# Patient Record
Sex: FEMALE | Race: WHITE | NOT HISPANIC OR LATINO
[De-identification: names, ages, dates, MRNs, and addresses within clinical notes are randomized per-mention and may not be internally consistent; named-entity substitution may affect disease eponyms.]

---

## 2017-11-08 ENCOUNTER — APPOINTMENT (OUTPATIENT)
Dept: ORTHOPEDIC SURGERY | Facility: CLINIC | Age: 62
End: 2017-11-08
Payer: MEDICARE

## 2017-11-08 PROCEDURE — 99213 OFFICE O/P EST LOW 20 MIN: CPT

## 2017-11-24 ENCOUNTER — OUTPATIENT (OUTPATIENT)
Dept: OUTPATIENT SERVICES | Facility: HOSPITAL | Age: 62
LOS: 1 days | End: 2017-11-24
Payer: COMMERCIAL

## 2017-11-24 VITALS
HEIGHT: 60 IN | HEART RATE: 81 BPM | RESPIRATION RATE: 14 BRPM | SYSTOLIC BLOOD PRESSURE: 146 MMHG | DIASTOLIC BLOOD PRESSURE: 91 MMHG | WEIGHT: 177.91 LBS | OXYGEN SATURATION: 98 % | TEMPERATURE: 99 F

## 2017-11-24 DIAGNOSIS — M16.11 UNILATERAL PRIMARY OSTEOARTHRITIS, RIGHT HIP: ICD-10-CM

## 2017-11-24 DIAGNOSIS — Z98.1 ARTHRODESIS STATUS: Chronic | ICD-10-CM

## 2017-11-24 DIAGNOSIS — Z01.818 ENCOUNTER FOR OTHER PREPROCEDURAL EXAMINATION: ICD-10-CM

## 2017-11-24 DIAGNOSIS — Z98.890 OTHER SPECIFIED POSTPROCEDURAL STATES: Chronic | ICD-10-CM

## 2017-11-24 DIAGNOSIS — Z90.710 ACQUIRED ABSENCE OF BOTH CERVIX AND UTERUS: Chronic | ICD-10-CM

## 2017-11-24 DIAGNOSIS — Z87.39 PERSONAL HISTORY OF OTHER DISEASES OF THE MUSCULOSKELETAL SYSTEM AND CONNECTIVE TISSUE: ICD-10-CM

## 2017-11-24 LAB
ALBUMIN SERPL ELPH-MCNC: 3.9 G/DL — SIGNIFICANT CHANGE UP (ref 3.3–5)
ALP SERPL-CCNC: 72 U/L — SIGNIFICANT CHANGE UP (ref 30–120)
ALT FLD-CCNC: 26 U/L DA — SIGNIFICANT CHANGE UP (ref 10–60)
ANION GAP SERPL CALC-SCNC: 7 MMOL/L — SIGNIFICANT CHANGE UP (ref 5–17)
APTT BLD: 31.7 SEC — SIGNIFICANT CHANGE UP (ref 27.5–37.4)
AST SERPL-CCNC: 22 U/L — SIGNIFICANT CHANGE UP (ref 10–40)
BILIRUB SERPL-MCNC: 0.4 MG/DL — SIGNIFICANT CHANGE UP (ref 0.2–1.2)
BLD GP AB SCN SERPL QL: SIGNIFICANT CHANGE UP
BUN SERPL-MCNC: 14 MG/DL — SIGNIFICANT CHANGE UP (ref 7–23)
CALCIUM SERPL-MCNC: 9.9 MG/DL — SIGNIFICANT CHANGE UP (ref 8.4–10.5)
CHLORIDE SERPL-SCNC: 104 MMOL/L — SIGNIFICANT CHANGE UP (ref 96–108)
CO2 SERPL-SCNC: 28 MMOL/L — SIGNIFICANT CHANGE UP (ref 22–31)
CREAT SERPL-MCNC: 0.63 MG/DL — SIGNIFICANT CHANGE UP (ref 0.5–1.3)
GLUCOSE SERPL-MCNC: 93 MG/DL — SIGNIFICANT CHANGE UP (ref 70–99)
HCT VFR BLD CALC: 39.5 % — SIGNIFICANT CHANGE UP (ref 34.5–45)
HGB BLD-MCNC: 13.1 G/DL — SIGNIFICANT CHANGE UP (ref 11.5–15.5)
INR BLD: 0.97 RATIO — SIGNIFICANT CHANGE UP (ref 0.88–1.16)
MCHC RBC-ENTMCNC: 33.1 GM/DL — SIGNIFICANT CHANGE UP (ref 32–36)
MCHC RBC-ENTMCNC: 33.1 PG — SIGNIFICANT CHANGE UP (ref 27–34)
MCV RBC AUTO: 99.8 FL — SIGNIFICANT CHANGE UP (ref 80–100)
PLATELET # BLD AUTO: 311 K/UL — SIGNIFICANT CHANGE UP (ref 150–400)
POTASSIUM SERPL-MCNC: 4.5 MMOL/L — SIGNIFICANT CHANGE UP (ref 3.5–5.3)
POTASSIUM SERPL-SCNC: 4.5 MMOL/L — SIGNIFICANT CHANGE UP (ref 3.5–5.3)
PROT SERPL-MCNC: 7.8 G/DL — SIGNIFICANT CHANGE UP (ref 6–8.3)
PROTHROM AB SERPL-ACNC: 10.6 SEC — SIGNIFICANT CHANGE UP (ref 9.8–12.7)
RBC # BLD: 3.96 M/UL — SIGNIFICANT CHANGE UP (ref 3.8–5.2)
RBC # FLD: 13.3 % — SIGNIFICANT CHANGE UP (ref 10.3–14.5)
SODIUM SERPL-SCNC: 139 MMOL/L — SIGNIFICANT CHANGE UP (ref 135–145)
WBC # BLD: 5.9 K/UL — SIGNIFICANT CHANGE UP (ref 3.8–10.5)
WBC # FLD AUTO: 5.9 K/UL — SIGNIFICANT CHANGE UP (ref 3.8–10.5)

## 2017-11-24 PROCEDURE — 86901 BLOOD TYPING SEROLOGIC RH(D): CPT

## 2017-11-24 PROCEDURE — 86900 BLOOD TYPING SEROLOGIC ABO: CPT

## 2017-11-24 PROCEDURE — G0463: CPT

## 2017-11-24 PROCEDURE — 85610 PROTHROMBIN TIME: CPT

## 2017-11-24 PROCEDURE — 87641 MR-STAPH DNA AMP PROBE: CPT

## 2017-11-24 PROCEDURE — 93005 ELECTROCARDIOGRAM TRACING: CPT

## 2017-11-24 PROCEDURE — 87640 STAPH A DNA AMP PROBE: CPT

## 2017-11-24 PROCEDURE — 85027 COMPLETE CBC AUTOMATED: CPT

## 2017-11-24 PROCEDURE — 85730 THROMBOPLASTIN TIME PARTIAL: CPT

## 2017-11-24 PROCEDURE — 93010 ELECTROCARDIOGRAM REPORT: CPT | Mod: NC

## 2017-11-24 PROCEDURE — 86850 RBC ANTIBODY SCREEN: CPT

## 2017-11-24 PROCEDURE — 80053 COMPREHEN METABOLIC PANEL: CPT

## 2017-11-24 NOTE — H&P PST ADULT - HISTORY OF PRESENT ILLNESS
62 y.o . female presents here w/ long standing hx. of right hip pain 10/10 w/ decreased R.O.M., ambulation, mobility and ADL function. Seen by  and referred for surgery.  Injection no help. Takes motrin, tylenol salonpas topical patch and oxycodone for pain w/ some relief

## 2017-11-24 NOTE — H&P PST ADULT - PSH
S/P ankle fusion  right and foot [id number]  S/P bunionectomy  left 12-15  and right 2003  S/P hysterectomy  uterus only 1982  S/P lumpectomy, left breast  2010

## 2017-11-24 NOTE — H&P PST ADULT - PROBLEM SELECTOR PLAN 1
Right Total Hip Replacement Anterior approach  NPO after Midnight. Take pepcid as ordered.  Nose cx instructions reviewed. Dietary instructions reviewed. 3day wipes reviewed. D/C instructions reviewed.  Patient advised of no jewelry day of surgery.  Handouts given.  Medication reviewed. Stop NSAIDS, ASA herbals, vit E per PMD instructions or 7 days prior to surgery .  Medical Clearance to be obtained.  O.A. ARVIND  right hip Day of Surgery you can take the following meds with a small sip of water. tylenol oxycodone

## 2017-11-24 NOTE — H&P PST ADULT - NSANTHOSAYNRD_GEN_A_CORE
No. AMAYA screening performed.  STOP BANG Legend: 0-2 = LOW Risk; 3-4 = INTERMEDIATE Risk; 5-8 = HIGH Risk

## 2017-11-25 LAB
MRSA PCR RESULT.: SIGNIFICANT CHANGE UP
S AUREUS DNA NOSE QL NAA+PROBE: SIGNIFICANT CHANGE UP

## 2017-12-05 ENCOUNTER — OUTPATIENT (OUTPATIENT)
Dept: OUTPATIENT SERVICES | Facility: HOSPITAL | Age: 62
LOS: 1 days | End: 2017-12-05

## 2017-12-05 ENCOUNTER — APPOINTMENT (OUTPATIENT)
Dept: ORTHOPEDIC SURGERY | Facility: HOSPITAL | Age: 62
End: 2017-12-05

## 2017-12-05 ENCOUNTER — OUTPATIENT (OUTPATIENT)
Dept: OUTPATIENT SERVICES | Facility: HOSPITAL | Age: 62
LOS: 1 days | End: 2017-12-05
Payer: COMMERCIAL

## 2017-12-05 DIAGNOSIS — Z98.890 OTHER SPECIFIED POSTPROCEDURAL STATES: Chronic | ICD-10-CM

## 2017-12-05 DIAGNOSIS — Z98.1 ARTHRODESIS STATUS: Chronic | ICD-10-CM

## 2017-12-05 DIAGNOSIS — Z90.710 ACQUIRED ABSENCE OF BOTH CERVIX AND UTERUS: Chronic | ICD-10-CM

## 2017-12-06 DIAGNOSIS — M16.11 UNILATERAL PRIMARY OSTEOARTHRITIS, RIGHT HIP: ICD-10-CM

## 2017-12-22 ENCOUNTER — APPOINTMENT (OUTPATIENT)
Dept: CV DIAGNOSITCS | Facility: HOSPITAL | Age: 62
End: 2017-12-22

## 2017-12-22 ENCOUNTER — OUTPATIENT (OUTPATIENT)
Dept: OUTPATIENT SERVICES | Facility: HOSPITAL | Age: 62
LOS: 1 days | End: 2017-12-22
Payer: COMMERCIAL

## 2017-12-22 ENCOUNTER — APPOINTMENT (OUTPATIENT)
Dept: CV DIAGNOSTICS | Facility: HOSPITAL | Age: 62
End: 2017-12-22

## 2017-12-22 DIAGNOSIS — Z98.1 ARTHRODESIS STATUS: Chronic | ICD-10-CM

## 2017-12-22 DIAGNOSIS — Z90.710 ACQUIRED ABSENCE OF BOTH CERVIX AND UTERUS: Chronic | ICD-10-CM

## 2017-12-22 DIAGNOSIS — I25.10 ATHEROSCLEROTIC HEART DISEASE OF NATIVE CORONARY ARTERY WITHOUT ANGINA PECTORIS: ICD-10-CM

## 2017-12-22 DIAGNOSIS — Z98.890 OTHER SPECIFIED POSTPROCEDURAL STATES: Chronic | ICD-10-CM

## 2017-12-22 PROCEDURE — 93306 TTE W/DOPPLER COMPLETE: CPT | Mod: 26

## 2017-12-22 PROCEDURE — C8929: CPT

## 2017-12-22 PROCEDURE — 78452 HT MUSCLE IMAGE SPECT MULT: CPT | Mod: 26

## 2017-12-22 PROCEDURE — 93017 CV STRESS TEST TRACING ONLY: CPT

## 2017-12-22 PROCEDURE — A9500: CPT

## 2017-12-22 PROCEDURE — 93016 CV STRESS TEST SUPVJ ONLY: CPT

## 2017-12-22 PROCEDURE — 78452 HT MUSCLE IMAGE SPECT MULT: CPT

## 2017-12-22 PROCEDURE — 93018 CV STRESS TEST I&R ONLY: CPT

## 2017-12-26 ENCOUNTER — OUTPATIENT (OUTPATIENT)
Dept: OUTPATIENT SERVICES | Facility: HOSPITAL | Age: 62
LOS: 1 days | Discharge: HOME | End: 2017-12-26
Payer: COMMERCIAL

## 2017-12-26 VITALS
TEMPERATURE: 97 F | OXYGEN SATURATION: 98 % | WEIGHT: 175.05 LBS | SYSTOLIC BLOOD PRESSURE: 173 MMHG | HEART RATE: 75 BPM | DIASTOLIC BLOOD PRESSURE: 84 MMHG | RESPIRATION RATE: 14 BRPM

## 2017-12-26 DIAGNOSIS — I49.8 OTHER SPECIFIED CARDIAC ARRHYTHMIAS: ICD-10-CM

## 2017-12-26 DIAGNOSIS — Z98.890 OTHER SPECIFIED POSTPROCEDURAL STATES: Chronic | ICD-10-CM

## 2017-12-26 DIAGNOSIS — Z90.710 ACQUIRED ABSENCE OF BOTH CERVIX AND UTERUS: Chronic | ICD-10-CM

## 2017-12-26 DIAGNOSIS — Z98.1 ARTHRODESIS STATUS: Chronic | ICD-10-CM

## 2017-12-26 LAB
ALBUMIN SERPL ELPH-MCNC: 4.5 G/DL — SIGNIFICANT CHANGE UP (ref 3.3–5)
ALP SERPL-CCNC: 74 U/L — SIGNIFICANT CHANGE UP (ref 40–120)
ALT FLD-CCNC: 18 U/L RC — SIGNIFICANT CHANGE UP (ref 10–45)
ANION GAP SERPL CALC-SCNC: 11 MMOL/L — SIGNIFICANT CHANGE UP (ref 5–17)
AST SERPL-CCNC: 21 U/L — SIGNIFICANT CHANGE UP (ref 10–40)
BILIRUB SERPL-MCNC: 0.5 MG/DL — SIGNIFICANT CHANGE UP (ref 0.2–1.2)
BUN SERPL-MCNC: 16 MG/DL — SIGNIFICANT CHANGE UP (ref 7–23)
CALCIUM SERPL-MCNC: 10.4 MG/DL — SIGNIFICANT CHANGE UP (ref 8.4–10.5)
CHLORIDE SERPL-SCNC: 99 MMOL/L — SIGNIFICANT CHANGE UP (ref 96–108)
CO2 SERPL-SCNC: 32 MMOL/L — HIGH (ref 22–31)
CREAT SERPL-MCNC: 0.75 MG/DL — SIGNIFICANT CHANGE UP (ref 0.5–1.3)
GLUCOSE SERPL-MCNC: 100 MG/DL — HIGH (ref 70–99)
HCT VFR BLD CALC: 41.3 % — SIGNIFICANT CHANGE UP (ref 34.5–45)
HGB BLD-MCNC: 13.9 G/DL — SIGNIFICANT CHANGE UP (ref 11.5–15.5)
MCHC RBC-ENTMCNC: 33.6 GM/DL — SIGNIFICANT CHANGE UP (ref 32–36)
MCHC RBC-ENTMCNC: 33.8 PG — SIGNIFICANT CHANGE UP (ref 27–34)
MCV RBC AUTO: 101 FL — HIGH (ref 80–100)
PLATELET # BLD AUTO: 359 K/UL — SIGNIFICANT CHANGE UP (ref 150–400)
POTASSIUM SERPL-MCNC: 4.2 MMOL/L — SIGNIFICANT CHANGE UP (ref 3.5–5.3)
POTASSIUM SERPL-SCNC: 4.2 MMOL/L — SIGNIFICANT CHANGE UP (ref 3.5–5.3)
PROT SERPL-MCNC: 8 G/DL — SIGNIFICANT CHANGE UP (ref 6–8.3)
RBC # BLD: 4.1 M/UL — SIGNIFICANT CHANGE UP (ref 3.8–5.2)
RBC # FLD: 13.2 % — SIGNIFICANT CHANGE UP (ref 10.3–14.5)
SODIUM SERPL-SCNC: 142 MMOL/L — SIGNIFICANT CHANGE UP (ref 135–145)
WBC # BLD: 8.2 K/UL — SIGNIFICANT CHANGE UP (ref 3.8–10.5)
WBC # FLD AUTO: 8.2 K/UL — SIGNIFICANT CHANGE UP (ref 3.8–10.5)

## 2017-12-26 PROCEDURE — C1769: CPT

## 2017-12-26 PROCEDURE — 99152 MOD SED SAME PHYS/QHP 5/>YRS: CPT

## 2017-12-26 PROCEDURE — 93005 ELECTROCARDIOGRAM TRACING: CPT

## 2017-12-26 PROCEDURE — 85027 COMPLETE CBC AUTOMATED: CPT

## 2017-12-26 PROCEDURE — 76937 US GUIDE VASCULAR ACCESS: CPT | Mod: 26

## 2017-12-26 PROCEDURE — 93458 L HRT ARTERY/VENTRICLE ANGIO: CPT

## 2017-12-26 PROCEDURE — C1887: CPT

## 2017-12-26 PROCEDURE — 93010 ELECTROCARDIOGRAM REPORT: CPT

## 2017-12-26 PROCEDURE — C1894: CPT

## 2017-12-26 PROCEDURE — 80053 COMPREHEN METABOLIC PANEL: CPT

## 2017-12-26 PROCEDURE — 76937 US GUIDE VASCULAR ACCESS: CPT

## 2017-12-26 PROCEDURE — 93458 L HRT ARTERY/VENTRICLE ANGIO: CPT | Mod: 26

## 2017-12-26 PROCEDURE — 99153 MOD SED SAME PHYS/QHP EA: CPT

## 2017-12-26 RX ORDER — SODIUM CHLORIDE 9 MG/ML
3 INJECTION INTRAMUSCULAR; INTRAVENOUS; SUBCUTANEOUS EVERY 8 HOURS
Qty: 0 | Refills: 0 | Status: DISCONTINUED | OUTPATIENT
Start: 2017-12-26 | End: 2018-01-10

## 2017-12-26 RX ORDER — ACETAMINOPHEN 500 MG
2 TABLET ORAL
Qty: 0 | Refills: 0 | COMMUNITY

## 2017-12-26 RX ORDER — IBUPROFEN 200 MG
1 TABLET ORAL
Qty: 0 | Refills: 0 | COMMUNITY

## 2017-12-26 RX ORDER — CHOLECALCIFEROL (VITAMIN D3) 125 MCG
1 CAPSULE ORAL
Qty: 0 | Refills: 0 | COMMUNITY

## 2017-12-26 RX ORDER — DIPHENHYDRAMINE HCL 50 MG
2 CAPSULE ORAL
Qty: 0 | Refills: 0 | COMMUNITY

## 2017-12-26 RX ORDER — ALPRAZOLAM 0.25 MG
0.25 TABLET ORAL ONCE
Qty: 0 | Refills: 0 | Status: DISCONTINUED | OUTPATIENT
Start: 2017-12-26 | End: 2018-01-10

## 2017-12-26 RX ORDER — DICLOFENAC SODIUM 75 MG/1
1 TABLET, DELAYED RELEASE ORAL
Qty: 0 | Refills: 0 | COMMUNITY

## 2017-12-26 RX ORDER — MAGNESIUM ASPARTATE HCL 61 MG(615)
1 TABLET, DELAYED RELEASE (ENTERIC COATED) ORAL
Qty: 0 | Refills: 0 | COMMUNITY

## 2017-12-26 NOTE — H&P CARDIOLOGY - PMH
Breast cancer in female  left breast  Frequency of urination  4x at night  History of osteoarthritis

## 2017-12-26 NOTE — H&P CARDIOLOGY - TOBACCO USE

## 2017-12-26 NOTE — H&P CARDIOLOGY - HISTORY OF PRESENT ILLNESS
61 yo female PMH severe OA of the right hip and left breast CA s/p lumpectomy presents today for cardiac cath. Patient reports preoperative testing for THR of the right hip- noted abnormal EKG and echo. Denies any reportable symptoms. Seen and evaluated by Dr. Edd Prince (cards) reveals there is a medium sized, moderate to severe defect in apical wall that is predominantly reversible consistent with ischemia. Referred for cardiac cath fir further cardiac evaluation Patient currently denies chest pain, palpitations, SOB, PND, orthopnea.     no antianginal therapy- as per Dr. Beck 63 yo female PMH severe OA of the right hip and left breast CA s/p lumpectomy & radiation presents today for cardiac cath. Patient reports preoperative testing for THR of the right hip- noted abnormal EKG and echo. Denies any reportable symptoms. Seen and evaluated by Dr. Edd Prince (cards) reveals there is a medium sized, moderate to severe defect in apical wall that is predominantly reversible consistent with ischemia. Referred for cardiac cath fir further cardiac evaluation Patient currently denies chest pain, palpitations, SOB, PND, orthopnea.     no antianginal therapy- as per Dr. Beck

## 2017-12-26 NOTE — H&P CARDIOLOGY - PSH
S/P ankle fusion  right and foot [id number]  S/P bunionectomy  left 12-15  and right 2003  S/P hysterectomy  uterus only 1982  S/P lumpectomy, left breast  2010  tx with raditation

## 2018-01-23 ENCOUNTER — APPOINTMENT (OUTPATIENT)
Dept: ORTHOPEDIC SURGERY | Facility: HOSPITAL | Age: 63
End: 2018-01-23
Payer: MEDICARE

## 2018-01-23 PROCEDURE — 27130 TOTAL HIP ARTHROPLASTY: CPT | Mod: RT

## 2018-02-07 ENCOUNTER — APPOINTMENT (OUTPATIENT)
Dept: ORTHOPEDIC SURGERY | Facility: CLINIC | Age: 63
End: 2018-02-07
Payer: MEDICARE

## 2018-02-07 PROCEDURE — 99024 POSTOP FOLLOW-UP VISIT: CPT

## 2018-02-07 PROCEDURE — 73502 X-RAY EXAM HIP UNI 2-3 VIEWS: CPT | Mod: RT

## 2018-02-07 RX ORDER — FAMOTIDINE 20 MG/1
20 TABLET, FILM COATED ORAL
Qty: 60 | Refills: 0 | Status: ACTIVE | COMMUNITY
Start: 2018-01-25

## 2018-02-15 NOTE — H&P PST ADULT - NS SC CAGE ALCOHOL ANNOYED YOU
[facility]    If you have any questions regarding to your visit please contact your care team:     Team Pink:   Clinic Hours Telephone Number   Internal Medicine:  Dr. Ina Stack NP       7am-7pm  Monday - Thursday   7am-5pm  Fridays  [phone]  (Appointment scheduling available 24/7)    Questions about your visit?  Team Line  [phone]   Urgent Care - [place] [facility] and [facility] - 11am-9pm Monday-Friday Saturday-Sunday- 9am-5pm   [place] - 5pm-9pm Monday-Friday Saturday-Sunday- 9am-5pm  [phone] - [place]   [phone] - [place]       What options do I have for visits at the clinic other than the traditional office visit?  To expand how we care for you, many of our providers are utilizing electronic visits (e-visits) and telephone visits, when medically appropriate, for interactions with their patients rather than a visit in the clinic.   We also offer nurse visits for many medical concerns. Just like any other service, we will bill your insurance company for this type of visit based on time spent on the phone with your provider. Not all insurance companies cover these visits. Please check with your medical insurance if this type of visit is covered. You will be responsible for any charges that are not paid by your insurance.      E-visits via [employer]:  generally incur a $35.00 fee.  Telephone visits:  Time spent on the phone: *charged based on time that is spent on the phone in increments of 10 minutes. Estimated cost:   5-10 mins $30.00   11-20 mins. $59.00   21-30 mins. $85.00   Use [employer] (secure email communication and access to your chart) to send your primary care provider a message or make an appointment. Ask someone on your Team how to sign up for [employer].    For a Price Quote for your services, please call our Consumer Price Line at [phone].    As always, Thank you for trusting us with your health care  needs!    Discharged By:  DUNIA BLANTON     no

## 2018-02-28 ENCOUNTER — APPOINTMENT (OUTPATIENT)
Dept: ORTHOPEDIC SURGERY | Facility: CLINIC | Age: 63
End: 2018-02-28
Payer: MEDICARE

## 2018-02-28 PROCEDURE — 99024 POSTOP FOLLOW-UP VISIT: CPT

## 2018-03-28 ENCOUNTER — APPOINTMENT (OUTPATIENT)
Dept: ORTHOPEDIC SURGERY | Facility: CLINIC | Age: 63
End: 2018-03-28
Payer: MEDICARE

## 2018-03-28 DIAGNOSIS — Z96.641 PRESENCE OF RIGHT ARTIFICIAL HIP JOINT: ICD-10-CM

## 2018-03-28 PROCEDURE — 99024 POSTOP FOLLOW-UP VISIT: CPT

## 2018-07-02 ENCOUNTER — INPATIENT (INPATIENT)
Facility: HOSPITAL | Age: 63
LOS: 0 days | Discharge: HOME | End: 2018-07-03
Attending: INTERNAL MEDICINE | Admitting: INTERNAL MEDICINE
Payer: MEDICARE

## 2018-07-02 VITALS
DIASTOLIC BLOOD PRESSURE: 94 MMHG | HEART RATE: 87 BPM | RESPIRATION RATE: 16 BRPM | SYSTOLIC BLOOD PRESSURE: 159 MMHG | TEMPERATURE: 98 F | OXYGEN SATURATION: 95 %

## 2018-07-02 DIAGNOSIS — I10 ESSENTIAL (PRIMARY) HYPERTENSION: ICD-10-CM

## 2018-07-02 DIAGNOSIS — Z98.890 OTHER SPECIFIED POSTPROCEDURAL STATES: Chronic | ICD-10-CM

## 2018-07-02 DIAGNOSIS — Z90.710 ACQUIRED ABSENCE OF BOTH CERVIX AND UTERUS: Chronic | ICD-10-CM
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
## 2018-07-02 DIAGNOSIS — C50.919 MALIGNANT NEOPLASM OF UNSPECIFIED SITE OF UNSPECIFIED FEMALE BREAST: ICD-10-CM

## 2018-07-02 DIAGNOSIS — L03.113 CELLULITIS OF RIGHT UPPER LIMB: ICD-10-CM

## 2018-07-02 DIAGNOSIS — M25.431 EFFUSION, RIGHT WRIST: ICD-10-CM

## 2018-07-02 DIAGNOSIS — Z98.1 ARTHRODESIS STATUS: Chronic | ICD-10-CM

## 2018-07-02 DIAGNOSIS — Z23 ENCOUNTER FOR IMMUNIZATION: ICD-10-CM

## 2018-07-02 LAB
ALBUMIN SERPL ELPH-MCNC: 3.7 G/DL — SIGNIFICANT CHANGE UP (ref 3.3–5)
ALP SERPL-CCNC: 66 U/L — SIGNIFICANT CHANGE UP (ref 40–120)
ALT FLD-CCNC: 16 U/L — SIGNIFICANT CHANGE UP (ref 4–33)
AST SERPL-CCNC: 25 U/L — SIGNIFICANT CHANGE UP (ref 4–32)
BASE EXCESS BLDV CALC-SCNC: -0.3 MMOL/L — SIGNIFICANT CHANGE UP
BASOPHILS # BLD AUTO: 0.02 K/UL — SIGNIFICANT CHANGE UP (ref 0–0.2)
BASOPHILS NFR BLD AUTO: 0.2 % — SIGNIFICANT CHANGE UP (ref 0–2)
BILIRUB SERPL-MCNC: 0.8 MG/DL — SIGNIFICANT CHANGE UP (ref 0.2–1.2)
BLOOD GAS VENOUS - CREATININE: 0.62 MG/DL — SIGNIFICANT CHANGE UP (ref 0.5–1.3)
BUN SERPL-MCNC: 14 MG/DL — SIGNIFICANT CHANGE UP (ref 7–23)
CALCIUM SERPL-MCNC: 8.9 MG/DL — SIGNIFICANT CHANGE UP (ref 8.4–10.5)
CHLORIDE BLDV-SCNC: 110 MMOL/L — HIGH (ref 96–108)
CHLORIDE SERPL-SCNC: 106 MMOL/L — SIGNIFICANT CHANGE UP (ref 98–107)
CO2 SERPL-SCNC: 21 MMOL/L — LOW (ref 22–31)
CREAT SERPL-MCNC: 0.58 MG/DL — SIGNIFICANT CHANGE UP (ref 0.5–1.3)
CRP SERPL-MCNC: 79.7 MG/L — HIGH
EOSINOPHIL # BLD AUTO: 0.04 K/UL — SIGNIFICANT CHANGE UP (ref 0–0.5)
EOSINOPHIL NFR BLD AUTO: 0.4 % — SIGNIFICANT CHANGE UP (ref 0–6)
GAS PNL BLDV: 138 MMOL/L — SIGNIFICANT CHANGE UP (ref 136–146)
GLUCOSE BLDV-MCNC: 101 — HIGH (ref 70–99)
GLUCOSE SERPL-MCNC: 100 MG/DL — HIGH (ref 70–99)
HCO3 BLDV-SCNC: 24 MMOL/L — SIGNIFICANT CHANGE UP (ref 20–27)
HCT VFR BLD CALC: 36.7 % — SIGNIFICANT CHANGE UP (ref 34.5–45)
HCT VFR BLDV CALC: 35.9 % — SIGNIFICANT CHANGE UP (ref 34.5–45)
HGB BLD-MCNC: 12.4 G/DL — SIGNIFICANT CHANGE UP (ref 11.5–15.5)
HGB BLDV-MCNC: 11.7 G/DL — SIGNIFICANT CHANGE UP (ref 11.5–15.5)
IMM GRANULOCYTES # BLD AUTO: 0.06 # — SIGNIFICANT CHANGE UP
IMM GRANULOCYTES NFR BLD AUTO: 0.6 % — SIGNIFICANT CHANGE UP (ref 0–1.5)
LACTATE BLDV-MCNC: 1 MMOL/L — SIGNIFICANT CHANGE UP (ref 0.5–2)
LYMPHOCYTES # BLD AUTO: 1.74 K/UL — SIGNIFICANT CHANGE UP (ref 1–3.3)
LYMPHOCYTES # BLD AUTO: 16.8 % — SIGNIFICANT CHANGE UP (ref 13–44)
MCHC RBC-ENTMCNC: 31.6 PG — SIGNIFICANT CHANGE UP (ref 27–34)
MCHC RBC-ENTMCNC: 33.8 % — SIGNIFICANT CHANGE UP (ref 32–36)
MCV RBC AUTO: 93.6 FL — SIGNIFICANT CHANGE UP (ref 80–100)
MONOCYTES # BLD AUTO: 1.11 K/UL — HIGH (ref 0–0.9)
MONOCYTES NFR BLD AUTO: 10.7 % — SIGNIFICANT CHANGE UP (ref 2–14)
NEUTROPHILS # BLD AUTO: 7.4 K/UL — SIGNIFICANT CHANGE UP (ref 1.8–7.4)
NEUTROPHILS NFR BLD AUTO: 71.3 % — SIGNIFICANT CHANGE UP (ref 43–77)
NRBC # FLD: 0 — SIGNIFICANT CHANGE UP
PCO2 BLDV: 40 MMHG — LOW (ref 41–51)
PH BLDV: 7.39 PH — SIGNIFICANT CHANGE UP (ref 7.32–7.43)
PLATELET # BLD AUTO: 168 K/UL — SIGNIFICANT CHANGE UP (ref 150–400)
PMV BLD: 9.7 FL — SIGNIFICANT CHANGE UP (ref 7–13)
PO2 BLDV: 71 MMHG — HIGH (ref 35–40)
POTASSIUM BLDV-SCNC: 3.8 MMOL/L — SIGNIFICANT CHANGE UP (ref 3.4–4.5)
POTASSIUM SERPL-MCNC: 4 MMOL/L — SIGNIFICANT CHANGE UP (ref 3.5–5.3)
POTASSIUM SERPL-SCNC: 4 MMOL/L — SIGNIFICANT CHANGE UP (ref 3.5–5.3)
PROT SERPL-MCNC: 6.9 G/DL — SIGNIFICANT CHANGE UP (ref 6–8.3)
RBC # BLD: 3.92 M/UL — SIGNIFICANT CHANGE UP (ref 3.8–5.2)
RBC # FLD: 16.7 % — HIGH (ref 10.3–14.5)
SAO2 % BLDV: 94 % — HIGH (ref 60–85)
SODIUM SERPL-SCNC: 139 MMOL/L — SIGNIFICANT CHANGE UP (ref 135–145)
WBC # BLD: 10.37 K/UL — SIGNIFICANT CHANGE UP (ref 3.8–10.5)
WBC # FLD AUTO: 10.37 K/UL — SIGNIFICANT CHANGE UP (ref 3.8–10.5)

## 2018-07-02 PROCEDURE — 73110 X-RAY EXAM OF WRIST: CPT | Mod: 26,RT

## 2018-07-02 PROCEDURE — 99222 1ST HOSP IP/OBS MODERATE 55: CPT

## 2018-07-02 PROCEDURE — 73130 X-RAY EXAM OF HAND: CPT | Mod: 26,RT

## 2018-07-02 RX ORDER — KETOROLAC TROMETHAMINE 30 MG/ML
15 SYRINGE (ML) INJECTION ONCE
Qty: 0 | Refills: 0 | Status: DISCONTINUED | OUTPATIENT
Start: 2018-07-02 | End: 2018-07-02

## 2018-07-02 RX ORDER — RABIES VACC, HUMAN DIPLOID/PF 2.5 UNIT
1 VIAL (EA) INTRAMUSCULAR ONCE
Qty: 0 | Refills: 0 | Status: COMPLETED | OUTPATIENT
Start: 2018-07-02 | End: 2018-07-02

## 2018-07-02 RX ORDER — UBIDECARENONE 100 MG
1 CAPSULE ORAL
Qty: 0 | Refills: 0 | COMMUNITY

## 2018-07-02 RX ORDER — MORPHINE SULFATE 50 MG/1
4 CAPSULE, EXTENDED RELEASE ORAL ONCE
Qty: 0 | Refills: 0 | Status: DISCONTINUED | OUTPATIENT
Start: 2018-07-02 | End: 2018-07-02

## 2018-07-02 RX ORDER — TETANUS TOXOID, REDUCED DIPHTHERIA TOXOID AND ACELLULAR PERTUSSIS VACCINE, ADSORBED 5; 2.5; 8; 8; 2.5 [IU]/.5ML; [IU]/.5ML; UG/.5ML; UG/.5ML; UG/.5ML
0.5 SUSPENSION INTRAMUSCULAR ONCE
Qty: 0 | Refills: 0 | Status: COMPLETED | OUTPATIENT
Start: 2018-07-02 | End: 2018-07-02

## 2018-07-02 RX ORDER — HUMAN RABIES VIRUS IMMUNE GLOBULIN 150 [IU]/ML
1600 INJECTION, SOLUTION INTRAMUSCULAR ONCE
Qty: 0 | Refills: 0 | Status: COMPLETED | OUTPATIENT
Start: 2018-07-02 | End: 2018-07-02

## 2018-07-02 RX ORDER — OMEGA-3 ACID ETHYL ESTERS 1 G
1 CAPSULE ORAL
Qty: 0 | Refills: 0 | COMMUNITY

## 2018-07-02 RX ORDER — AMPICILLIN SODIUM AND SULBACTAM SODIUM 250; 125 MG/ML; MG/ML
3 INJECTION, POWDER, FOR SUSPENSION INTRAMUSCULAR; INTRAVENOUS EVERY 6 HOURS
Qty: 0 | Refills: 0 | Status: DISCONTINUED | OUTPATIENT
Start: 2018-07-02 | End: 2018-07-03

## 2018-07-02 RX ORDER — MORPHINE SULFATE 50 MG/1
2 CAPSULE, EXTENDED RELEASE ORAL ONCE
Qty: 0 | Refills: 0 | Status: DISCONTINUED | OUTPATIENT
Start: 2018-07-02 | End: 2018-07-02

## 2018-07-02 RX ORDER — LANOLIN ALCOHOL/MO/W.PET/CERES
3 CREAM (GRAM) TOPICAL AT BEDTIME
Qty: 0 | Refills: 0 | Status: DISCONTINUED | OUTPATIENT
Start: 2018-07-02 | End: 2018-07-03

## 2018-07-02 RX ORDER — HEPARIN SODIUM 5000 [USP'U]/ML
5000 INJECTION INTRAVENOUS; SUBCUTANEOUS EVERY 12 HOURS
Qty: 0 | Refills: 0 | Status: DISCONTINUED | OUTPATIENT
Start: 2018-07-02 | End: 2018-07-03

## 2018-07-02 RX ORDER — CALCIUM CARBONATE 500(1250)
1 TABLET ORAL
Qty: 0 | Refills: 0 | COMMUNITY

## 2018-07-02 RX ORDER — AMPICILLIN SODIUM AND SULBACTAM SODIUM 250; 125 MG/ML; MG/ML
3 INJECTION, POWDER, FOR SUSPENSION INTRAMUSCULAR; INTRAVENOUS ONCE
Qty: 0 | Refills: 0 | Status: COMPLETED | OUTPATIENT
Start: 2018-07-02 | End: 2018-07-02

## 2018-07-02 RX ORDER — ACETAMINOPHEN 500 MG
650 TABLET ORAL EVERY 6 HOURS
Qty: 0 | Refills: 0 | Status: DISCONTINUED | OUTPATIENT
Start: 2018-07-02 | End: 2018-07-03

## 2018-07-02 RX ADMIN — Medication 3 MILLIGRAM(S): at 23:56

## 2018-07-02 RX ADMIN — AMPICILLIN SODIUM AND SULBACTAM SODIUM 200 GRAM(S): 250; 125 INJECTION, POWDER, FOR SUSPENSION INTRAMUSCULAR; INTRAVENOUS at 08:57

## 2018-07-02 RX ADMIN — TETANUS TOXOID, REDUCED DIPHTHERIA TOXOID AND ACELLULAR PERTUSSIS VACCINE, ADSORBED 0.5 MILLILITER(S): 5; 2.5; 8; 8; 2.5 SUSPENSION INTRAMUSCULAR at 08:57

## 2018-07-02 RX ADMIN — HUMAN RABIES VIRUS IMMUNE GLOBULIN 1600 UNIT(S): 150 INJECTION, SOLUTION INTRAMUSCULAR at 11:35

## 2018-07-02 RX ADMIN — MORPHINE SULFATE 2 MILLIGRAM(S): 50 CAPSULE, EXTENDED RELEASE ORAL at 15:43

## 2018-07-02 RX ADMIN — Medication 1 MILLILITER(S): at 10:35

## 2018-07-02 RX ADMIN — AMPICILLIN SODIUM AND SULBACTAM SODIUM 200 GRAM(S): 250; 125 INJECTION, POWDER, FOR SUSPENSION INTRAMUSCULAR; INTRAVENOUS at 23:37

## 2018-07-02 RX ADMIN — Medication 15 MILLIGRAM(S): at 09:49

## 2018-07-02 RX ADMIN — Medication 15 MILLIGRAM(S): at 23:52

## 2018-07-02 RX ADMIN — MORPHINE SULFATE 4 MILLIGRAM(S): 50 CAPSULE, EXTENDED RELEASE ORAL at 11:20

## 2018-07-02 RX ADMIN — Medication 15 MILLIGRAM(S): at 09:14

## 2018-07-02 RX ADMIN — MORPHINE SULFATE 2 MILLIGRAM(S): 50 CAPSULE, EXTENDED RELEASE ORAL at 16:13

## 2018-07-02 RX ADMIN — Medication 650 MILLIGRAM(S): at 19:34

## 2018-07-02 RX ADMIN — Medication 1 TABLET(S): at 18:22

## 2018-07-02 RX ADMIN — Medication 15 MILLIGRAM(S): at 23:37

## 2018-07-02 RX ADMIN — AMPICILLIN SODIUM AND SULBACTAM SODIUM 200 GRAM(S): 250; 125 INJECTION, POWDER, FOR SUSPENSION INTRAMUSCULAR; INTRAVENOUS at 18:21

## 2018-07-02 RX ADMIN — MORPHINE SULFATE 4 MILLIGRAM(S): 50 CAPSULE, EXTENDED RELEASE ORAL at 11:35

## 2018-07-02 RX ADMIN — HEPARIN SODIUM 5000 UNIT(S): 5000 INJECTION INTRAVENOUS; SUBCUTANEOUS at 18:22

## 2018-07-02 NOTE — ED PROVIDER NOTE - PSH
S/P ankle fusion  right and foot [id number]  S/P bunionectomy  left 12-15  and right 2003  S/P hysterectomy  uterus only 1982  S/P lumpectomy, left breast  2010  tx with raditation

## 2018-07-02 NOTE — H&P ADULT - HISTORY OF PRESENT ILLNESS
61yo right handed female PMH OA, breast Ca s/p chemo/rad p/w right hand swelling since being bitten by a cat 2 days ago. She was petting an unknown cat (belongs to her neighbor prophylaxis ) when the cat bit her. Cat was acting normally as per pt. C/o chills, pain with movement of her right hand and wrist. She started with pain which has progressively worsened last two days and now presents to the Emergency Department. No nausea, vomiting or diarrhea. Started noticing some streaking up her arm

## 2018-07-02 NOTE — ED PROVIDER NOTE - CARE PLAN
Principal Discharge DX:	Cellulitis of right upper extremity  Secondary Diagnosis:	Cat bite, initial encounter

## 2018-07-02 NOTE — H&P ADULT - NSHPPHYSICALEXAM_GEN_ALL_CORE
PHYSICAL EXAM: vital signs as above  in no apparent distress  HEENT: CARL EOMI  Neck: Supple, no JVD, no thyromegaly  Lungs: no rhonchi, no wheeze, no crackles  CVS: S1 S2 no M/R/G  Abdomen: no tenderness, no organomegaly, BS present  Neuro: AO x 3 no focal weakness, no sensory abnormalities  Psych: appropriate affect  Skin: warm, dry  Ext: no cyanosis or clubbing, 2 small puncture wounds, 1 on dorsum of right wrist and 1 on radial aspect of thenar eminence. + purulent scanty discharge from dorsal wrist puncture. Erythema of dorsum of right wrist, + swelling, tracking erythema down dorsum of right forearm  Msk: no joint swelling or deformities  Back: no CVA tenderness, no kyphosis/scoliosis

## 2018-07-02 NOTE — CONSULT NOTE ADULT - SUBJECTIVE AND OBJECTIVE BOX
ERNA ZAVALA 62y Female  MRN-[id number]    Patient is a 62y old  Female who presents with a chief complaint of right hand swelling    HPI: 61yo right handed female pmh OA, breast CA s/p chemo/rad p/w right hand swelling since being bitten by a cat 2 days ago. She was petting  an unknown cat on the street when the cat bit her. Cat was acting normally as per pt. C/o chills, pain with movement of her right hand and wrist.     Denies fevers, n/v/d, chest pain, dyspnea.    Denies fever, chills, chest pain, dyspnea, palpitations, dizziness, weakness, recent cough, nausea, vomiting, diarrhea, abdominal pain, bladder and bowel problems, back pain, leg swelling, sick contact, recent long travel. Last tetanus > 10 years ago    Got Td x 1 and rabies vaccine x 1 and immunoglobulin x 1 in ER    PAST MEDICAL & SURGICAL HISTORY:  Frequency of urination: 4x at night  Breast cancer in female: left breast  History of osteoarthritis  S/P bunionectomy: left 12-15  and right 2003  S/P ankle fusion: right and foot [id number]  S/P hysterectomy: uterus only 1982  S/P lumpectomy, left breast: 2010  tx with raditation      Allergies    No Known Allergies    Intolerances        ANTIMICROBIALS:  unasyn 3 gm iv x1    MEDICATIONS  (STANDING):  · 	Multiple Vitamins oral tablet: 1 tab(s) orally once a day  · 	calcium carbonate 1250 mg (500 mg elemental calcium) oral tablet: 1 tab(s) orally 2 times a day  · 	CoQ10 300 mg oral capsule: 1 cap(s) orally once a day  	· Omega-3 1000 mg oral capsule: 1 cap(s) orally once a day    Social History  Smoking: no  Etoh: social etoh  Drug use: no      FAMILY HISTORY:  No pertinent family history in first degree relatives      Vital Signs Last 24 Hrs  T(C): 37.3 (02 Jul 2018 11:16), Max: 37.3 (02 Jul 2018 11:16)  T(F): 99.1 (02 Jul 2018 11:16), Max: 99.1 (02 Jul 2018 11:16)  HR: 85 (02 Jul 2018 11:16) (85 - 87)  BP: 151/75 (02 Jul 2018 11:16) (151/75 - 159/94)  BP(mean): --  RR: 16 (02 Jul 2018 11:16) (16 - 16)  SpO2: 100% (02 Jul 2018 11:16) (95% - 100%)    CBC Full  -  ( 02 Jul 2018 08:00 )  WBC Count : 10.37 K/uL  Hemoglobin : 12.4 g/dL  Hematocrit : 36.7 %  Platelet Count - Automated : 168 K/uL  Mean Cell Volume : 93.6 fL  Mean Cell Hemoglobin : 31.6 pg  Mean Cell Hemoglobin Concentration : 33.8 %  Auto Neutrophil # : 7.40 K/uL  Auto Lymphocyte # : 1.74 K/uL  Auto Monocyte # : 1.11 K/uL  Auto Eosinophil # : 0.04 K/uL  Auto Basophil # : 0.02 K/uL  Auto Neutrophil % : 71.3 %  Auto Lymphocyte % : 16.8 %  Auto Monocyte % : 10.7 %  Auto Eosinophil % : 0.4 %  Auto Basophil % : 0.2 %    07-02    139  |  106  |  14  ----------------------------<  100<H>  4.0   |  21<L>  |  0.58    Ca    8.9      02 Jul 2018 08:00    TPro  6.9  /  Alb  3.7  /  TBili  0.8  /  DBili  x   /  AST  25  /  ALT  16  /  AlkPhos  66  07-02    LIVER FUNCTIONS - ( 02 Jul 2018 08:00 )  Alb: 3.7 g/dL / Pro: 6.9 g/dL / ALK PHOS: 66 u/L / ALT: 16 u/L / AST: 25 u/L / GGT: x               MICROBIOLOGY:          v              RADIOLOGY

## 2018-07-02 NOTE — CONSULT NOTE ADULT - MUSCULOSKELETAL
details… detailed exam right wrist swelling+ with puncture wound and slight cellulitis traveling proximally/decreased ROM due to pain

## 2018-07-02 NOTE — ED PROVIDER NOTE - CHIEF COMPLAINT
The patient is a 62y Female complaining of The patient is a 62y Female complaining of right hand swelling

## 2018-07-02 NOTE — ED PROVIDER NOTE - PROGRESS NOTE DETAILS
Juan Marr, PGY2: 10.7mL of rabies Ig injected into right hand near both puncture wounds. Pt tolerated injection well.

## 2018-07-02 NOTE — ED PROVIDER NOTE - SKIN COLOR
2 small puncture wounds, 1 on dorsum of right wrist and 1 on radial aspect of thenar eminence. +purulence discharge from dorsal wrist puncture. Erythema of dorsum of right wrist, + swelling, tracking erythema down dorsum of right forearm

## 2018-07-02 NOTE — H&P ADULT - PSH
S/P ankle fusion  right and foot [id number]  S/P bunionectomy  left 12-15  and right 2003  S/P hysterectomy  uterus only 1982  S/P lumpectomy, left breast  2010  tx with raditation

## 2018-07-02 NOTE — ED PROVIDER NOTE - ATTENDING CONTRIBUTION TO CARE
62F h.o breast c/o- s/p XRT Chemo 8 years ago, R Hip replacement, presents woth R hand pain and swelling s/p cat bite 2 days ago. Cat may be able to be identified (currently attempting to reach owners) No fever. On exam: VSS R hand + puncture wounds on dorsum. Redness warmth swelling and some limitation of movement at wrist and fingers.  IMP: Hand infection 2 days s/p cat bite. Plan: labs cultures, xrays, IV antibiotics, tetanus immunization, hand consult, attempt to identify and observe cat otherwise rabies immunization

## 2018-07-02 NOTE — CONSULT NOTE ADULT - PROBLEM SELECTOR RECOMMENDATION 4
-got rabies vaccine x 1 and immunoglobulin x 1  -continue rabies vaccine series for post exposure prophylaxis - on day 3, 7 and 14

## 2018-07-02 NOTE — ED PROVIDER NOTE - MEDICAL DECISION MAKING DETAILS
63yo female, right handed with right hand cat bite puncture wounds, + erythema, swelling, discharge, pain with passive flexion. Started IV unasyn, Tdap, labs, toradol, xray right hand and wrist. Hand and ortho consulted. Will admit. Waiting for patient to find owner of cat. If not, will give rabies vaccines and Ig.

## 2018-07-02 NOTE — CONSULT NOTE ADULT - ATTENDING COMMENTS
Herb Gay  Attending Physician   Division of Infectious Disease  Pager #[phone]  After 5pm/weekend or no response, call #[phone]

## 2018-07-02 NOTE — CONSULT NOTE ADULT - SUBJECTIVE AND OBJECTIVE BOX
Orthopaedic Surgery Consult Note    HPI: 61 y/o F RHD who presents with R hand pain and swelling after sustaining a cat bite yesterday. Patient reports petting a stray cat that bit or R hand multiple times. She reports that the animal was behaving in a friendly manner when it bite her with no overt signs of rabies. Denies any other injuries. Currently denies fever, chills, and reports having difficulty in making a fist because her hand is swollen.       PAST MEDICAL & SURGICAL HISTORY:  Frequency of urination: 4x at night  Breast cancer in female: left breast  History of osteoarthritis  S/P bunionectomy: left 12-15  and right 2003  S/P ankle fusion: right and foot [id number]  S/P hysterectomy: uterus only 1982  S/P lumpectomy, left breast: 2010  tx with raditation    [] No significant past history as reviewed with the patient and family    FAMILY HISTORY:  No pertinent family history in first degree relatives    [] Family history not pertinent as reviewed with the patient and family    SOCIAL HISTORY:    MEDICATIONS  (STANDING):  rabies virus Vaccine  (RABAVERT) 1 milliLiter(s) IntraMuscular once    MEDICATIONS  (PRN):    Allergies    No Known Allergies    Intolerances        REVIEW OF SYSTEMS  [x] All review of systems negative except for those marked.  Systemic:	[] Fever		[] Chills		[] Night sweats		[] Fatigue	[] Other  [] Cardiovascular:  [] Pulmonary:  [] Renal/Urologic:  [] Gastrointestinal:  [] Metabolic:  [] Neurologic:  [] Hematologic:  [] ENT:  [] Ophthalmologic:  [] Musculoskeletal: see HPI    Vital Signs Last 24 Hrs  T(C): 36.9 (02 Jul 2018 07:23), Max: 36.9 (02 Jul 2018 07:23)  T(F): 98.4 (02 Jul 2018 07:23), Max: 98.4 (02 Jul 2018 07:23)  HR: 87 (02 Jul 2018 07:23) (87 - 87)  BP: 159/94 (02 Jul 2018 07:23) (159/94 - 159/94)  BP(mean): --  RR: 16 (02 Jul 2018 07:23) (16 - 16)  SpO2: 95% (02 Jul 2018 07:23) (95% - 95%)      PHYSICAL EXAM:  RUE  Warm R hand with swelling   No finger swelling   Puncture wounds in thenar eminence and dorsally over thumb and on ulnar side  No active drainage, erythema around puncture   skin intact  motor AIN/PIN/U intact  SILT M/U/R  wwp                            12.4   10.37 )-----------( 168      ( 02 Jul 2018 08:00 )             36.7     07-02    139  |  106  |  14  ----------------------------<  100<H>  4.0   |  21<L>  |  0.58    Ca    8.9      02 Jul 2018 08:00    TPro  6.9  /  Alb  3.7  /  TBili  0.8  /  DBili  x   /  AST  25  /  ALT  16  /  AlkPhos  66  07-02          IMAGING STUDIES:    62y Female Orthopaedic Surgery Consult Note    HPI: 63 y/o F RHD who presents with R hand pain and swelling after sustaining a cat bite yesterday. Patient reports petting a neighbor's cat that bit or R hand multiple times. She reports that the animal was behaving in a friendly manner when it bite her with no overt signs of rabies. Denies any other injuries. Currently denies fever, chills, and reports having difficulty in making a fist because her hand is swollen.       PAST MEDICAL & SURGICAL HISTORY:  Frequency of urination: 4x at night  Breast cancer in female: left breast  History of osteoarthritis  S/P bunionectomy: left 12-15  and right 2003  S/P ankle fusion: right and foot [id number]  S/P hysterectomy: uterus only 1982  S/P lumpectomy, left breast: 2010  tx with raditation    [] No significant past history as reviewed with the patient and family    FAMILY HISTORY:  No pertinent family history in first degree relatives    [] Family history not pertinent as reviewed with the patient and family    SOCIAL HISTORY:    MEDICATIONS  (STANDING):  rabies virus Vaccine  (RABAVERT) 1 milliLiter(s) IntraMuscular once    MEDICATIONS  (PRN):    Allergies    No Known Allergies    Intolerances        REVIEW OF SYSTEMS  [x] All review of systems negative except for those marked.  Systemic:	[] Fever		[] Chills		[] Night sweats		[] Fatigue	[] Other  [] Cardiovascular:  [] Pulmonary:  [] Renal/Urologic:  [] Gastrointestinal:  [] Metabolic:  [] Neurologic:  [] Hematologic:  [] ENT:  [] Ophthalmologic:  [] Musculoskeletal: see HPI    Vital Signs Last 24 Hrs  T(C): 36.9 (02 Jul 2018 07:23), Max: 36.9 (02 Jul 2018 07:23)  T(F): 98.4 (02 Jul 2018 07:23), Max: 98.4 (02 Jul 2018 07:23)  HR: 87 (02 Jul 2018 07:23) (87 - 87)  BP: 159/94 (02 Jul 2018 07:23) (159/94 - 159/94)  BP(mean): --  RR: 16 (02 Jul 2018 07:23) (16 - 16)  SpO2: 95% (02 Jul 2018 07:23) (95% - 95%)      PHYSICAL EXAM:  RUE  Warm R hand with swelling and erythema down to 5 cm distal to wrist joint  No finger swelling   Puncture wounds in thenar eminence and dorsally over thumb and on ulnar side  No active drainage, erythema around puncture sites  Patient able to actively flex and extend fingers without too much pain  Able to partially make a fist without too much pain, limited by hand swelling   No pain on passive passive or flexion   skin intact  motor AIN/PIN/U intact  SILT M/U/R  wwp                            12.4   10.37 )-----------( 168      ( 02 Jul 2018 08:00 )             36.7     07-02    139  |  106  |  14  ----------------------------<  100<H>  4.0   |  21<L>  |  0.58    Ca    8.9      02 Jul 2018 08:00    TPro  6.9  /  Alb  3.7  /  TBili  0.8  /  DBili  x   /  AST  25  /  ALT  16  /  AlkPhos  66  07-02          IMAGING STUDIES:  R hand XR;  -Diffuse soft tissue swelling, no evidence of fracture of dislocation

## 2018-07-02 NOTE — ED PROVIDER NOTE - OBJECTIVE STATEMENT
61yo right handed female pmh OA, breast CA s/p chemo/rad p/w right hand swelling since being bitten by a cat 2 days ago. She was petting an unknown cat on the street when the cat bit her. Cat was acting normally as per pt. C/o chills, pain with movement of her right hand and wrist. Denies fevers, n/v/d, chest pain, dyspnea.  Denies fever, chills, chest pain, dyspnea, palpitations, dizziness, weakness, recent cough, nausea, vomiting, diarrhea, abdominal pain, bladder and bowel problems, back pain, leg swelling, sick contact, recent long travel. Last tetanus > 10 years ago    Significant past medical hx/surgical hx/social hx and review of systems can be found above in the history of present illness.

## 2018-07-02 NOTE — H&P ADULT - ASSESSMENT
61yo right handed female PMH OA, breast Ca s/p chemo/rad comes to [facility] Emergency Department with progressive right hand swelling and pain since being bitten by a cat 2 days ago.

## 2018-07-02 NOTE — ED ADULT TRIAGE NOTE - CHIEF COMPLAINT QUOTE
Complaining of right hand pain and edema, patient was bitten by a cat on Saturday.  Several small wounds, edema,. redness and warm to touch noted to right hand.  Patient was able to make fist until this morning.  She is able move fingers.   Denies fever or chills.  Patient notice discharge from wounds yesterday.

## 2018-07-02 NOTE — H&P ADULT - NSHPREVIEWOFSYSTEMS_GEN_ALL_CORE
Gen: no loss of wt no loss of appetite see above HPI   ENT: no dizziness no hearing loss  Ophth: no blurring of vision no loss of vision  Resp: No cough no sputum production  CVS: No chest pain no palpitations no orthopnea  GI: no N/V/D  : no dysuria, hematuria  Endo: no polyuria no excessive sweating  Neuro: no weakness no paresthesias  Psych: No suicidal no depressive ideation  Heme: No petechiae no easy bruising  Msk: No joint pain no swelling right arm pain and swelling   Skin: No rash no itching  All other ROS negative

## 2018-07-02 NOTE — ED ADULT NURSE NOTE - OBJECTIVE STATEMENT
Patient received in intake #10C c/o cat bite on right hand on saturday. Patient A&OX3, ambulatory. Patient reports hand swelled up, got better, but got worse today. Patient reports she does not know whose cat it was "but it didn't look like a stray cat". Patient denies any fevers at home. Will continue to monitor.

## 2018-07-02 NOTE — CONSULT NOTE ADULT - ASSESSMENT
62y Female with after sustaining a hand bite from a neighbor's cat. She has soft tissue swelling erythema suggestive infection of the right hand. Low suspicion for flexor tenosynovitis or other deep infection of hand.     -Admit to medicine for IV abx   -pain control  -OOB  -DVT ppx: Venodynes  -FU ESR  -No acute surgical intervention at this time, will monitor on IV abx  -D/w attending Dr. Hernandez
61yo right handed female pmh OA, breast CA s/p chemo/rad p/w right hand swelling since being bitten by a cat 2 days ago with cat bite, cellulitis, hand/wrist swelling and got TD/rabies vaccine and immunoglobulin in ER

## 2018-07-02 NOTE — H&P ADULT - PROBLEM SELECTOR PLAN 1
ortho and ID evaluations noted  will follow recommendations  continue Unasyn for now  will closely monitor

## 2018-07-02 NOTE — PATIENT PROFILE ADULT. - BLOOD AVOIDANCE/RESTRICTIONS, PROFILE
"POST NAIL PROCEDURE INSTRUCTIONS    1. Leave bandage intact for 12-24 hours. If the bandage sticks as you try to remove it, soak it in warm water until it lifts off.    2. Soak toe daily in warm water and Epsom salts for 5 - 8 minutes.     3. Dry foot completely after soaking, and apply a small amount of triple antibiotic ointment (neosporin works fine!) and a fabric or cloth bandaid ("plastic" bandaids tend to lift off with ointment use).  Wear open-toed shoes as needed for comfort.     4. Take Advil or Tylenol as needed for pain.     5. Your toe may drain for the next few days. Normal drainage is yellow-to-pink, and clear, much like the fluid in a blister. Watch for redness spreading up your toe into your foot, white thick drainage (pus), pain unrelieved by medication, or nausea/vomiting/fever/chills. These are signs of infection. Please call the clinic or visit your doctor.    6. You may stop soaking and dressing toe once it stops draining (about 3 - 7 days).      " none

## 2018-07-03 ENCOUNTER — TRANSCRIPTION ENCOUNTER (OUTPATIENT)
Age: 63
End: 2018-07-03

## 2018-07-03 VITALS
TEMPERATURE: 99 F | DIASTOLIC BLOOD PRESSURE: 96 MMHG | RESPIRATION RATE: 20 BRPM | SYSTOLIC BLOOD PRESSURE: 150 MMHG | OXYGEN SATURATION: 100 % | HEART RATE: 105 BPM

## 2018-07-03 LAB
BUN SERPL-MCNC: 16 MG/DL — SIGNIFICANT CHANGE UP (ref 7–23)
CALCIUM SERPL-MCNC: 8.6 MG/DL — SIGNIFICANT CHANGE UP (ref 8.4–10.5)
CHLORIDE SERPL-SCNC: 104 MMOL/L — SIGNIFICANT CHANGE UP (ref 98–107)
CO2 SERPL-SCNC: 23 MMOL/L — SIGNIFICANT CHANGE UP (ref 22–31)
CREAT SERPL-MCNC: 0.67 MG/DL — SIGNIFICANT CHANGE UP (ref 0.5–1.3)
GLUCOSE SERPL-MCNC: 98 MG/DL — SIGNIFICANT CHANGE UP (ref 70–99)
HCT VFR BLD CALC: 33.8 % — LOW (ref 34.5–45)
HCV AB S/CO SERPL IA: 0.08 S/CO — SIGNIFICANT CHANGE UP
HCV AB SERPL-IMP: SIGNIFICANT CHANGE UP
HGB BLD-MCNC: 11.4 G/DL — LOW (ref 11.5–15.5)
MCHC RBC-ENTMCNC: 31.6 PG — SIGNIFICANT CHANGE UP (ref 27–34)
MCHC RBC-ENTMCNC: 33.7 % — SIGNIFICANT CHANGE UP (ref 32–36)
MCV RBC AUTO: 93.6 FL — SIGNIFICANT CHANGE UP (ref 80–100)
NRBC # FLD: 0 — SIGNIFICANT CHANGE UP
PLATELET # BLD AUTO: 255 K/UL — SIGNIFICANT CHANGE UP (ref 150–400)
PMV BLD: 9.2 FL — SIGNIFICANT CHANGE UP (ref 7–13)
POTASSIUM SERPL-MCNC: 3.9 MMOL/L — SIGNIFICANT CHANGE UP (ref 3.5–5.3)
POTASSIUM SERPL-SCNC: 3.9 MMOL/L — SIGNIFICANT CHANGE UP (ref 3.5–5.3)
RBC # BLD: 3.61 M/UL — LOW (ref 3.8–5.2)
RBC # FLD: 16.4 % — HIGH (ref 10.3–14.5)
SODIUM SERPL-SCNC: 139 MMOL/L — SIGNIFICANT CHANGE UP (ref 135–145)
SPECIMEN SOURCE: SIGNIFICANT CHANGE UP
SPECIMEN SOURCE: SIGNIFICANT CHANGE UP
WBC # BLD: 8.35 K/UL — SIGNIFICANT CHANGE UP (ref 3.8–10.5)
WBC # FLD AUTO: 8.35 K/UL — SIGNIFICANT CHANGE UP (ref 3.8–10.5)

## 2018-07-03 PROCEDURE — 99232 SBSQ HOSP IP/OBS MODERATE 35: CPT

## 2018-07-03 RX ORDER — LACTOBACILLUS ACIDOPHILUS 100MM CELL
1 CAPSULE ORAL
Qty: 15 | Refills: 0
Start: 2018-07-03 | End: 2018-07-17

## 2018-07-03 RX ORDER — ACETAMINOPHEN 500 MG
2 TABLET ORAL
Qty: 0 | Refills: 0 | DISCHARGE
Start: 2018-07-03

## 2018-07-03 RX ORDER — BLACK COHOSH ROOT 20 MG
1 TABLET ORAL
Qty: 0 | Refills: 0 | COMMUNITY

## 2018-07-03 RX ORDER — IBUPROFEN 200 MG
0 TABLET ORAL
Qty: 0 | Refills: 0 | COMMUNITY

## 2018-07-03 RX ADMIN — AMPICILLIN SODIUM AND SULBACTAM SODIUM 200 GRAM(S): 250; 125 INJECTION, POWDER, FOR SUSPENSION INTRAMUSCULAR; INTRAVENOUS at 05:52

## 2018-07-03 RX ADMIN — AMPICILLIN SODIUM AND SULBACTAM SODIUM 200 GRAM(S): 250; 125 INJECTION, POWDER, FOR SUSPENSION INTRAMUSCULAR; INTRAVENOUS at 12:32

## 2018-07-03 RX ADMIN — Medication 1 TABLET(S): at 12:31

## 2018-07-03 RX ADMIN — HEPARIN SODIUM 5000 UNIT(S): 5000 INJECTION INTRAVENOUS; SUBCUTANEOUS at 05:53

## 2018-07-03 NOTE — DISCHARGE NOTE ADULT - HOSPITAL COURSE
61yo right handed female PMH OA, breast Ca s/p chemo/rad p/w right hand swelling since being bitten by a cat 2 days ago. She was petting an unknown cat (belongs to her neighbor prophylaxis ) when the cat bit her. Cat was acting normally as per pt. C/o chills, pain with movement of her right hand and wrist. She started with pain which has progressively worsened last two days and now presents to the Emergency Department.  Pt evaluated by ortho -No acute surgical intervention. Pt treated w/  IV  Unasyn, X-ray of wrist unremarkable   Rabies-s/p rabies vaccine x 1 and immunoglobulin x 1. Pt to Continue rabies vaccine series for post exposure prophylaxis - on day 3, 7 and 14.     Breast cancer -in remission per patient- outpatient follow up as

## 2018-07-03 NOTE — DISCHARGE NOTE ADULT - PATIENT PORTAL LINK FT
You can access the [facility] Patient Portal, offered by [facility], by registering with the following website: [email or web address]

## 2018-07-03 NOTE — PROGRESS NOTE ADULT - ATTENDING COMMENTS
discussed with patient in detail, all questions answered
Herb aGy  Attending Physician   Division of Infectious Disease  Pager #[phone]  After 5pm/weekend or no response, call #[phone]

## 2018-07-03 NOTE — DISCHARGE NOTE ADULT - CARE PLAN
Principal Discharge DX:	Cellulitis of right upper extremity  Goal:	resolved infection  Assessment and plan of treatment:	Pt evaluated by ortho -No acute surgical intervention. Pt treated w/  IV  Unasyn, X-ray of wrist unremarkable  Secondary Diagnosis:	Rabies, need for prophylactic vaccination against  Goal:	further follow up  Assessment and plan of treatment:	Rabies-s/p rabies vaccine x 1 and immunoglobulin x 1. Continue rabies vaccine series for post exposure prophylaxis - on day 3, 7 and 14.  Secondary Diagnosis:	Breast cancer in female  Goal:	follow up  Assessment and plan of treatment:	Follow up  w/ your oncologist as scheduled Principal Discharge DX:	Cellulitis of right upper extremity  Goal:	resolved infection  Assessment and plan of treatment:	Pt evaluated by ortho -No acute surgical intervention. Pt treated w/  IV  Unasyn, X-ray of wrist unremarkable. Please complete total of 10 days of Augmentin 875 mg 2x day. You can take Acidophilus daily to prevent diarrhea  Keep your hand elevated  Secondary Diagnosis:	Rabies, need for prophylactic vaccination against  Goal:	further follow up  Assessment and plan of treatment:	Rabies-s/p rabies vaccine x 1 and immunoglobulin x 1. Continue rabies vaccine series for post exposure prophylaxis - on day 3, 7 and 14.  -f/u in ID office next week [phone], call to schedule an appt w/ dr Moctezuma  Secondary Diagnosis:	Breast cancer in female  Goal:	follow up  Assessment and plan of treatment:	Follow up  w/ your oncologist as scheduled Principal Discharge DX:	Cellulitis of right upper extremity  Goal:	resolved infection  Assessment and plan of treatment:	Pt evaluated by ortho -No acute surgical intervention. Pt treated w/  IV  Unasyn, X-ray of wrist unremarkable. Please complete total of 10 days of Augmentin 875 mg 2x day. You can take Acidophilus daily to prevent diarrhea  Keep your hand elevated  Secondary Diagnosis:	Rabies, need for prophylactic vaccination against  Goal:	further follow up  Assessment and plan of treatment:	Rabies-s/p rabies vaccine x 1 and immunoglobulin x 1 on 7/2/2018  *****Continue rabies vaccine series for post exposure prophylaxis - on day 3, 7 and 14*******  -f/u in ID office next week [phone], call to schedule an appt w/ dr Moctezuma  Secondary Diagnosis:	Breast cancer in female  Goal:	follow up  Assessment and plan of treatment:	Follow up  w/ your oncologist as scheduled Principal Discharge DX:	Cellulitis of right upper extremity  Goal:	resolved infection  Assessment and plan of treatment:	Pt evaluated by ortho -No acute surgical intervention. Pt treated w/  IV  Unasyn, X-ray of wrist unremarkable. Please complete total of 10 days of Augmentin 875 mg 2x day. You can take Acidophilus daily to prevent diarrhea  Keep your hand elevated  Secondary Diagnosis:	Rabies, need for prophylactic vaccination against  Goal:	further follow up  Assessment and plan of treatment:	Rabies-s/p rabies vaccine x 1 and immunoglobulin x 1 on 7/2/2018  *****Continue rabies vaccine series for post exposure prophylaxis - on day 3, 7 and 14*******  -f/u in ID office next week [phone], call to schedule an appt w/ dr Moctezuma  You can go to [facility],  [address] [phone]  Secondary Diagnosis:	Breast cancer in female  Goal:	follow up  Assessment and plan of treatment:	Follow up  w/ your oncologist as scheduled

## 2018-07-03 NOTE — PROGRESS NOTE ADULT - SUBJECTIVE AND OBJECTIVE BOX
ERNA ZAVALA 62y [id number]    Patient is a 62y old  Female who presents with a chief complaint of right hand swelling and pain (03 Jul 2018 07:44)      Follow Up/CC:  ID following for cellulitis    Interval History/ROS: feels better    Allergies    No Known Allergies    Intolerances        ANTIMICROBIALS:  ampicillin/sulbactam  IVPB 3 every 6 hours      MEDICATIONS  (STANDING):  ampicillin/sulbactam  IVPB 3 Gram(s) IV Intermittent every 6 hours  heparin  Injectable 5000 Unit(s) SubCutaneous every 12 hours  multivitamin 1 Tablet(s) Oral daily    MEDICATIONS  (PRN):  acetaminophen   Tablet 650 milliGRAM(s) Oral every 6 hours PRN For Temp greater than 38 C (100.4 F)  melatonin 3 milliGRAM(s) Oral at bedtime PRN Insomnia        Vital Signs Last 24 Hrs  T(C): 37.1 (03 Jul 2018 05:56), Max: 37.9 (02 Jul 2018 19:07)  T(F): 98.7 (03 Jul 2018 05:56), Max: 100.2 (02 Jul 2018 19:07)  HR: 90 (03 Jul 2018 05:56) (88 - 105)  BP: 142/69 (03 Jul 2018 05:56) (141/69 - 148/74)  BP(mean): --  RR: 18 (03 Jul 2018 05:56) (16 - 18)  SpO2: 100% (03 Jul 2018 05:56) (97% - 100%)    CBC Full  -  ( 03 Jul 2018 06:20 )  WBC Count : 8.35 K/uL  Hemoglobin : 11.4 g/dL  Hematocrit : 33.8 %  Platelet Count - Automated : 255 K/uL  Mean Cell Volume : 93.6 fL  Mean Cell Hemoglobin : 31.6 pg  Mean Cell Hemoglobin Concentration : 33.7 %  Auto Neutrophil # : x  Auto Lymphocyte # : x  Auto Monocyte # : x  Auto Eosinophil # : x  Auto Basophil # : x  Auto Neutrophil % : x  Auto Lymphocyte % : x  Auto Monocyte % : x  Auto Eosinophil % : x  Auto Basophil % : x    07-03    139  |  104  |  16  ----------------------------<  98  3.9   |  23  |  0.67    Ca    8.6      03 Jul 2018 06:20    TPro  6.9  /  Alb  3.7  /  TBili  0.8  /  DBili  x   /  AST  25  /  ALT  16  /  AlkPhos  66  07-02    LIVER FUNCTIONS - ( 02 Jul 2018 08:00 )  Alb: 3.7 g/dL / Pro: 6.9 g/dL / ALK PHOS: 66 u/L / ALT: 16 u/L / AST: 25 u/L / GGT: x               MICROBIOLOGY:          v            RADIOLOGY
No acute events overnight. Pain controlled. Patient says her swelling has subjectively improved since yesterday.     PE:  Vital Signs Last 24 Hrs  T(C): 37.1 (03 Jul 2018 05:56), Max: 37.9 (02 Jul 2018 19:07)  T(F): 98.7 (03 Jul 2018 05:56), Max: 100.2 (02 Jul 2018 19:07)  HR: 90 (03 Jul 2018 05:56) (85 - 105)  BP: 142/69 (03 Jul 2018 05:56) (141/69 - 159/94)  BP(mean): --  RR: 18 (03 Jul 2018 05:56) (16 - 18)  SpO2: 100% (03 Jul 2018 05:56) (95% - 100%)    PHYSICAL EXAM:  RUE  Warm R hand with swelling and erythema down to 5 cm distal to wrist joint, had not extended since last exam  No finger swelling  Puncture wounds in thenar eminence and dorsally over thumb and on ulnar side  No active drainage, erythema around puncture sites  Patient able to actively flex and extend fingers without too much pain, no pain on passive motion  Able to partially make a fist without too much pain, limited by hand swelling   skin intact  motor AIN/PIN/U intact  SILT M/U/R  wwp    Labs:                        12.4   10.37 )-----------( 168      ( 02 Jul 2018 08:00 )             36.7   07-02    139  |  106  |  14  ----------------------------<  100<H>  4.0   |  21<L>  |  0.58    Ca    8.9      02 Jul 2018 08:00    TPro  6.9  /  Alb  3.7  /  TBili  0.8  /  DBili  x   /  AST  25  /  ALT  16  /  AlkPhos  66  07-02        62y Female with after sustaining a hand bite from a neighbor's cat. She has soft tissue swelling erythema suggestive infection of the right hand. Low suspicion for flexor tenosynovitis or other deep infection of hand. Swelling has minimally improved from yesterday.     -Continue IV abx   -pain control  -OOB  -DVT ppx: Venodynes  -FU ESR  -No acute surgical intervention at this time, will monitor on IV abx  -D/w attending Dr. Hernandez
Patient is a 62y old  Female who presents with a chief complaint of right hand swelling and pain (03 Jul 2018 07:44)      SUBJECTIVE / OVERNIGHT EVENTS: no overnight events  'right hand feels a lot improved'    ROS:  Resp: No cough no sputum production  CVS: No chest pain no palpitations no orthopnea  GI: no N/V/D  : no dysuria, no hematuria  Neuro: no weakness no paresthesias  Heme: No petechiae no easy bruising  Msk: No joint pain no swelling  Skin: No rash no itching        MEDICATIONS  (STANDING):  ampicillin/sulbactam  IVPB 3 Gram(s) IV Intermittent every 6 hours  heparin  Injectable 5000 Unit(s) SubCutaneous every 12 hours  multivitamin 1 Tablet(s) Oral daily    MEDICATIONS  (PRN):  acetaminophen   Tablet 650 milliGRAM(s) Oral every 6 hours PRN For Temp greater than 38 C (100.4 F)  melatonin 3 milliGRAM(s) Oral at bedtime PRN Insomnia        CAPILLARY BLOOD GLUCOSE        I&O's Summary      Vital Signs Last 24 Hrs  T(C): 37.2 (03 Jul 2018 15:09), Max: 37.9 (02 Jul 2018 19:07)  T(F): 99 (03 Jul 2018 15:09), Max: 100.2 (02 Jul 2018 19:07)  HR: 105 (03 Jul 2018 15:09) (88 - 105)  BP: 150/96 (03 Jul 2018 15:09) (141/69 - 150/96)  BP(mean): --  RR: 20 (03 Jul 2018 15:09) (16 - 20)  SpO2: 100% (03 Jul 2018 15:09) (97% - 100%)    PHYSICAL EXAM: vital signs as above  in no apparent distress  HEENT: CARL EOMI  Neck: Supple, no JVD, no thyromegaly  Lungs: no rhonchi, no wheeze, no crackles  CVS: S1 S2 no M/R/G  Abdomen: no tenderness, no organomegaly, BS present  Neuro: AO x 3 no focal weakness, no sensory abnormalities  Psych: appropriate affect  Skin: warm, dry  Ext: much improved  will continue to monitor   Msk: no joint swelling or deformities  Back: no CVA tenderness, no kyphosis/scoliosis    LABS:                        11.4   8.35  )-----------( 255      ( 03 Jul 2018 06:20 )             33.8     07-03    139  |  104  |  16  ----------------------------<  98  3.9   |  23  |  0.67    Ca    8.6      03 Jul 2018 06:20    TPro  6.9  /  Alb  3.7  /  TBili  0.8  /  DBili  x   /  AST  25  /  ALT  16  /  AlkPhos  66  07-02                All consultant(s) notes reviewed and care discussed with other providers    Contact Number, Dr Vallecillo [phone]

## 2018-07-03 NOTE — PROGRESS NOTE ADULT - ASSESSMENT
61yo right handed female PMH OA, breast Ca s/p chemo/rad comes to [facility] Emergency Department with progressive right hand swelling and pain since being bitten by a cat 2 days ago.
63yo right handed female pmh OA, breast CA s/p chemo/rad p/w right hand swelling since being bitten by a cat 2 days ago with cat bite, cellulitis, hand/wrist swelling and got TD/rabies vaccine and immunoglobulin in ER

## 2018-07-03 NOTE — DISCHARGE NOTE ADULT - MEDICATION SUMMARY - MEDICATIONS TO STOP TAKING
I will STOP taking the medications listed below when I get home from the hospital:    ibuprofen  -- As Needed    black cohosh oral tablet  -- 1 tab(s) by mouth once a day

## 2018-07-03 NOTE — DISCHARGE NOTE ADULT - PROVIDER TOKENS
BRITTON:'[id number]' TOKEN:'[id number]',FREE:[LAST:[Dr Foley ID],PHONE:[(   )    -],FAX:[(   )    -],ADDRESS:[-f/u in ID office next week [phone]]]

## 2018-07-03 NOTE — DISCHARGE NOTE ADULT - CARE PROVIDER_API CALL
Emanuel Hernandez), Orthopaedic Surgery; Surgery of the Hand  [address]  [place] [address]  Phone: [phone]  Fax: [phone] Emanuel Hernandez), Orthopaedic Surgery; Surgery of the Hand  [address]  [address]  [place] [address]  Phone: [phone]  Fax: [phone]    Dr Foley ID,   -f/u in ID office next week [phone]  Phone: (   )    -  Fax: (   )    -

## 2018-07-03 NOTE — DISCHARGE NOTE ADULT - PLAN OF CARE
resolved infection Pt evaluated by ortho -No acute surgical intervention. Pt treated w/  IV  Unasyn, X-ray of wrist unremarkable further follow up Rabies-s/p rabies vaccine x 1 and immunoglobulin x 1. Continue rabies vaccine series for post exposure prophylaxis - on day 3, 7 and 14. follow up Follow up  w/ your oncologist as scheduled Pt evaluated by ortho -No acute surgical intervention. Pt treated w/  IV  Unasyn, X-ray of wrist unremarkable. Please complete total of 10 days of Augmentin 875 mg 2x day. You can take Acidophilus daily to prevent diarrhea  Keep your hand elevated Rabies-s/p rabies vaccine x 1 and immunoglobulin x 1. Continue rabies vaccine series for post exposure prophylaxis - on day 3, 7 and 14.  -f/u in ID office next week [phone], call to schedule an appt w/ dr Moctezuma Rabies-s/p rabies vaccine x 1 and immunoglobulin x 1 on 7/2/2018  *****Continue rabies vaccine series for post exposure prophylaxis - on day 3, 7 and 14*******  -f/u in ID office next week [phone], call to schedule an appt w/ dr Moctezuma Rabies-s/p rabies vaccine x 1 and immunoglobulin x 1 on 7/2/2018  *****Continue rabies vaccine series for post exposure prophylaxis - on day 3, 7 and 14*******  -f/u in ID office next week [phone], call to schedule an appt w/ dr Moctezuma  You can go to [facility],  [address] [phone]

## 2018-07-03 NOTE — DISCHARGE NOTE ADULT - MEDICATION SUMMARY - MEDICATIONS TO TAKE
I will START or STAY ON the medications listed below when I get home from the hospital:    Calcium  -- 1 tab(s) by mouth 2 times a day  -- Indication: For supplement     acetaminophen 325 mg oral tablet  -- 2 tab(s) by mouth every 6 hours, As needed, For Temp greater than 38 C (100.4 F)  -- Indication: For pain    Fish Oil oral capsule  -- 1 cap(s) by mouth once a day  -- Indication: For supplement     CoQ10  -- 1 cap(s) by mouth 2 times a day  -- Indication: For supplement     Augmentin 875 mg-125 mg oral tablet  -- 875 milligram(s) by mouth every 12 hours   -- Finish all this medication unless otherwise directed by prescriber.  Take with food or milk.    -- Indication: For Cellulitis of right upper extremity    Acidophilus oral capsule  -- 1 cap(s) by mouth once a day   -- Indication: For Prophylaxis     Multiple Vitamins oral tablet  -- 1 tab(s) by mouth once a day  -- Indication: For supplement     biotin  -- 1 dose(s) by mouth once a day  -- Indication: For supplement     Vitamin D3  -- 1 tab(s) by mouth once a day  -- Indication: For supplement

## 2018-07-03 NOTE — PROGRESS NOTE ADULT - PROBLEM SELECTOR PLAN 1
much improved  cleared by ID  po Augmentin  outpatient follow up for Rabies vaccination arranged by ADS NP
-better  -ortho input noted

## 2018-07-03 NOTE — PROGRESS NOTE ADULT - PROBLEM SELECTOR PLAN 2
in remission per patient  will follow up as outpatient with oncology
-better  -DC with augmentin 875 mg po BID x 10 days  -potential side effects of abx explained including GI, Cdiff, etc.  -if worse, return to ER  -f/u in ID office next week [phone]

## 2018-07-07 LAB
BACTERIA BLD CULT: SIGNIFICANT CHANGE UP
BACTERIA BLD CULT: SIGNIFICANT CHANGE UP

## 2018-07-10 ENCOUNTER — FORM ENCOUNTER (OUTPATIENT)
Age: 63
End: 2018-07-10

## 2018-07-11 ENCOUNTER — OUTPATIENT (OUTPATIENT)
Dept: OUTPATIENT SERVICES | Facility: HOSPITAL | Age: 63
LOS: 1 days | End: 2018-07-11

## 2018-07-11 ENCOUNTER — APPOINTMENT (OUTPATIENT)
Dept: RADIOLOGY | Facility: CLINIC | Age: 63
End: 2018-07-11

## 2018-07-11 ENCOUNTER — APPOINTMENT (OUTPATIENT)
Dept: INFECTIOUS DISEASE | Facility: CLINIC | Age: 63
End: 2018-07-11
Payer: MEDICARE

## 2018-07-11 ENCOUNTER — OUTPATIENT (OUTPATIENT)
Dept: OUTPATIENT SERVICES | Facility: HOSPITAL | Age: 63
LOS: 1 days | End: 2018-07-11
Payer: COMMERCIAL

## 2018-07-11 VITALS
OXYGEN SATURATION: 99 % | SYSTOLIC BLOOD PRESSURE: 152 MMHG | HEART RATE: 100 BPM | TEMPERATURE: 98.7 F | WEIGHT: 170 LBS | DIASTOLIC BLOOD PRESSURE: 89 MMHG | RESPIRATION RATE: 18 BRPM

## 2018-07-11 DIAGNOSIS — Z90.710 ACQUIRED ABSENCE OF BOTH CERVIX AND UTERUS: Chronic | ICD-10-CM

## 2018-07-11 DIAGNOSIS — Z98.890 OTHER SPECIFIED POSTPROCEDURAL STATES: Chronic | ICD-10-CM

## 2018-07-11 DIAGNOSIS — Z98.1 ARTHRODESIS STATUS: Chronic | ICD-10-CM

## 2018-07-11 DIAGNOSIS — Z09 ENCOUNTER FOR FOLLOW-UP EXAMINATION AFTER COMPLETED TREATMENT FOR CONDITIONS OTHER THAN MALIGNANT NEOPLASM: ICD-10-CM

## 2018-07-11 PROCEDURE — 99213 OFFICE O/P EST LOW 20 MIN: CPT

## 2018-07-11 PROCEDURE — 73110 X-RAY EXAM OF WRIST: CPT

## 2018-07-11 PROCEDURE — 73110 X-RAY EXAM OF WRIST: CPT | Mod: 26,RT

## 2018-07-16 ENCOUNTER — APPOINTMENT (OUTPATIENT)
Dept: ORTHOPEDIC SURGERY | Facility: CLINIC | Age: 63
End: 2018-07-16
Payer: MEDICARE

## 2018-07-16 VITALS — WEIGHT: 170 LBS | HEIGHT: 61 IN | BODY MASS INDEX: 32.1 KG/M2

## 2018-07-16 PROBLEM — C50.919 MALIGNANT NEOPLASM OF UNSPECIFIED SITE OF UNSPECIFIED FEMALE BREAST: Chronic | Status: ACTIVE | Noted: 2017-11-24

## 2018-07-16 PROCEDURE — 99214 OFFICE O/P EST MOD 30 MIN: CPT

## 2018-07-17 PROBLEM — R35.0 FREQUENCY OF MICTURITION: Chronic | Status: ACTIVE | Noted: 2017-11-24

## 2018-07-17 PROBLEM — Z87.39 PERSONAL HISTORY OF OTHER DISEASES OF THE MUSCULOSKELETAL SYSTEM AND CONNECTIVE TISSUE: Chronic | Status: ACTIVE | Noted: 2017-11-24

## 2018-07-24 ENCOUNTER — APPOINTMENT (OUTPATIENT)
Dept: INFECTIOUS DISEASE | Facility: CLINIC | Age: 63
End: 2018-07-24
Payer: MEDICARE

## 2018-07-24 VITALS
WEIGHT: 170 LBS | HEIGHT: 61 IN | BODY MASS INDEX: 32.1 KG/M2 | DIASTOLIC BLOOD PRESSURE: 86 MMHG | SYSTOLIC BLOOD PRESSURE: 136 MMHG | TEMPERATURE: 99.1 F | HEART RATE: 78 BPM | OXYGEN SATURATION: 93 %

## 2018-07-24 PROCEDURE — 99213 OFFICE O/P EST LOW 20 MIN: CPT

## 2018-07-30 LAB
BASOPHILS # BLD AUTO: 0.01 K/UL
BASOPHILS NFR BLD AUTO: 0.2 %
EOSINOPHIL # BLD AUTO: 0.05 K/UL
EOSINOPHIL NFR BLD AUTO: 1 %
ERYTHROCYTE [SEDIMENTATION RATE] IN BLOOD BY WESTERGREN METHOD: 34 MM/HR
HCT VFR BLD CALC: 39.9 %
HGB BLD-MCNC: 13.1 G/DL
IMM GRANULOCYTES NFR BLD AUTO: 0.2 %
LYMPHOCYTES # BLD AUTO: 1.94 K/UL
LYMPHOCYTES NFR BLD AUTO: 39.4 %
MAN DIFF?: NORMAL
MCHC RBC-ENTMCNC: 32.4 PG
MCHC RBC-ENTMCNC: 32.8 GM/DL
MCV RBC AUTO: 98.8 FL
MONOCYTES # BLD AUTO: 0.47 K/UL
MONOCYTES NFR BLD AUTO: 9.6 %
NEUTROPHILS # BLD AUTO: 2.44 K/UL
NEUTROPHILS NFR BLD AUTO: 49.6 %
PLATELET # BLD AUTO: 479 K/UL
RBC # BLD: 4.04 M/UL
RBC # FLD: 15.2 %
WBC # FLD AUTO: 4.92 K/UL

## 2018-08-27 ENCOUNTER — APPOINTMENT (OUTPATIENT)
Dept: ORTHOPEDIC SURGERY | Facility: CLINIC | Age: 63
End: 2018-08-27
Payer: MEDICARE

## 2018-08-27 PROCEDURE — 99213 OFFICE O/P EST LOW 20 MIN: CPT

## 2018-08-28 ENCOUNTER — APPOINTMENT (OUTPATIENT)
Dept: INFECTIOUS DISEASE | Facility: CLINIC | Age: 63
End: 2018-08-28
Payer: MEDICARE

## 2018-08-28 VITALS
HEART RATE: 90 BPM | DIASTOLIC BLOOD PRESSURE: 93 MMHG | HEIGHT: 61 IN | SYSTOLIC BLOOD PRESSURE: 139 MMHG | TEMPERATURE: 99.3 F | OXYGEN SATURATION: 98 %
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
## 2018-08-28 PROCEDURE — 99213 OFFICE O/P EST LOW 20 MIN: CPT

## 2019-12-12 NOTE — PROGRESS NOTE ADULT - CARDIOVASCULAR DETAILS
positive S1/positive S2 Dermal Autograft Text: The defect edges were debeveled with a #15 scalpel blade.  Given the location of the defect, shape of the defect and the proximity to free margins a dermal autograft was deemed most appropriate.  Using a sterile surgical marker, the primary defect shape was transferred to the donor site. The area thus outlined was incised deep to adipose tissue with a #15 scalpel blade.  The harvested graft was then trimmed of adipose and epidermal tissue until only dermis was left.  The skin graft was then placed in the primary defect and oriented appropriately.

## 2020-08-26 NOTE — H&P CARDIOLOGY - BP NONINVASIVE SYSTOLIC (MM HG)
8/26/20 1:47 pm - Spoke to Gabriel and updated her on status of MRI. Called Dr Bliss Dural office and they have no record of pt's surgery/ operative report/ or type of surgical aneurysm clips that were used in patient. They purge records after seven years if pt has not been seen/ come into his office. No record of operative report in Epic either. It is unsafe to scan pt without knowing for certain that her aneurysm clips are mri compatible. Unable to do the MRI due to this. RN aware. 173

## 2021-01-13 NOTE — DISCHARGE NOTE ADULT - VISION (WITH CORRECTIVE LENSES IF THE PATIENT USUALLY WEARS THEM):
Patient contacted regarding COVID-19 risk and screening. Discussed COVID-19 related testing which was available at this time. Test results were negative. Patient informed of results, if available? yes     Care Transition Nurse/ Ambulatory Care Manager/ LPN Care Coordinator contacted the patient by telephone to perform follow-up assessment. Verified name and  with patient as identifiers. Patient has following risk factors of: no known risk factors. Symptoms reviewed with patient who verbalized the following symptoms: no new symptoms and no worsening symptoms. Due to no new or worsening symptoms encounter was not routed to provider for escalation. Education provided regarding infection prevention, and signs and symptoms of COVID-19 and when to seek medical attention with patient who verbalized understanding. Discussed exposure protocols and quarantine from [address] you at higher risk for severe illness  and given an opportunity for questions and concerns. The patient agrees to contact the COVID-19 hotline [phone] or PCP office for questions related to their healthcare. CTN/ACM/LPN provided contact information for future reference. From CDC: Are you at higher risk for severe illness?  Wash your hands often.  Avoid close contact (6 feet, which is about two arm lengths) with people who are sick.  Put distance between yourself and other people if COVID-19 is spreading in your community.  Clean and disinfect frequently touched surfaces.  Avoid all cruise travel and non-essential air travel.  Call your healthcare professional if you have concerns about COVID-19 and your underlying condition or if you are sick. For more information on steps you can take to protect yourself, see CDC's How to [place] for follow-up call in 5-7 days based on severity of symptoms and risk factors.
Normal vision: sees adequately in most situations; can see medication labels, newsprint

## 2021-06-20 NOTE — H&P PST ADULT - GENITOURINARY
bell within reach. will reassess details…

## 2022-08-24 ENCOUNTER — APPOINTMENT (OUTPATIENT)
Dept: ORTHOPEDIC SURGERY | Facility: CLINIC | Age: 67
End: 2022-08-24

## 2022-08-24 VITALS — WEIGHT: 170 LBS | HEIGHT: 61 IN | BODY MASS INDEX: 32.1 KG/M2

## 2022-08-24 DIAGNOSIS — Z98.1 ARTHRODESIS STATUS: ICD-10-CM

## 2022-08-24 PROCEDURE — 73630 X-RAY EXAM OF FOOT: CPT | Mod: RT

## 2022-08-24 PROCEDURE — 73600 X-RAY EXAM OF ANKLE: CPT | Mod: RT

## 2022-08-24 PROCEDURE — 99213 OFFICE O/P EST LOW 20 MIN: CPT

## 2022-08-24 NOTE — HISTORY OF PRESENT ILLNESS
[0] : 0 [] : yes [de-identified] :  Pt. presents for f/u of her RT foot/ankle.. She is s/p RT triple arthrodesis in 2014 by Dr. Lombardi. She developed subsequent ankle OA.  She has been using her [place] brace which she continues to do well with.  She ambulates with a cane. [FreeTextEntry1] : RT foot

## 2022-08-24 NOTE — ASSESSMENT
Patient given Rx for glasses. Use for class and reading/computer use. [FreeTextEntry1] : Patient is stable and doing well.  She will continue with the [place] brace.  Ice/nsaids prn.\par Follow up in one year or earlier if she has a problem.\par \par Repeat foot/ankle  x-ray will be performed at the next office visit.\par

## 2022-08-24 NOTE — PHYSICAL EXAM
[2nd] : 2nd [Mild] : mild diffused ankle swelling [4___] : eversion 4[unfilled]/5 [2+] : posterior tibialis pulse: 2+ [Normal] : saphenous nerve sensation normal [] : no pain when stressing lateral tarsal metatarsal joint [Right] : right foot [Weight -] : weightbearing [FreeTextEntry9] : Valgus talar tillt (no significant change from xrays one year ago). [de-identified] : Well healed triple arthrodesis with hardware in good position (no significant change from xrays one year ago). [de-identified] : plantar flexion 30 degrees [de-identified] : inversion 0 degrees [de-identified] : eversion 15 degrees [TWNoteComboBox7] : dorsiflexion 15 degrees

## 2023-02-01 NOTE — PROGRESS NOTE ADULT - PROBLEM SELECTOR PLAN 3
Pharmacy Medication History  Admission medication history interview status for the 1/31/2023  admission is complete. See EPIC admission navigator for prior to admission medications     Location of Interview: Patient room  Medication history sources: Patient    Patient states that he is currently not taking any medications.     Medication reconciliation completed by provider prior to medication history? No    Time spent in this activity: 5 minutes    Prior to Admission medications    Not on File       The information provided in this note is only as accurate as the sources available at the time of update(s)   
will continue to monitor  patient reluctant to be treated yet with meds  she said she will follow up with her PCP and prefers to use garlic for treatment  will encourage follow up for same with PCP
-slightly better  -limb elevation

## 2023-05-05 ENCOUNTER — APPOINTMENT (OUTPATIENT)
Dept: UROLOGY | Facility: CLINIC | Age: 68
End: 2023-05-05
Payer: MEDICARE

## 2023-05-05 VITALS — DIASTOLIC BLOOD PRESSURE: 87 MMHG | SYSTOLIC BLOOD PRESSURE: 138 MMHG | HEART RATE: 82 BPM

## 2023-05-05 DIAGNOSIS — Z78.9 OTHER SPECIFIED HEALTH STATUS: ICD-10-CM

## 2023-05-05 DIAGNOSIS — N39.498 OTHER SPECIFIED URINARY INCONTINENCE: ICD-10-CM

## 2023-05-05 DIAGNOSIS — R35.0 FREQUENCY OF MICTURITION: ICD-10-CM

## 2023-05-05 PROCEDURE — 99204 OFFICE O/P NEW MOD 45 MIN: CPT

## 2023-05-05 NOTE — ASSESSMENT
[FreeTextEntry1] : sev years of ANGELLA  with cough and sneeze\par  ( vag) \par no LUTS, no stran to void , feels empty after void \par no hematuria , no dysuria \par no hx of renal stones\par no tobacco or exposure \par bowel habis ok \par weight stable \par no issues with sex\par has not seen anyone for this\par here for further eval:\par 1- check urine\par 2-pvr- zero\par 3- will refer to dr karey garcia for possible surgery if urine normal

## 2023-05-05 NOTE — HISTORY OF PRESENT ILLNESS
[FreeTextEntry1] : sev years of ANGELLA  with cough and sneeze\par denies urge and urge INC\par  ( vag) \par no LUTS, no stran to void , feels empty after void \par no hematuria , no dysuria \par no hx of renal stones\par no tobacco or exposure \par bowel habis ok \par weight stable \par no issues with sex\par has not seen anyone for this\par here for further eval:

## 2023-05-08 LAB
APPEARANCE: ABNORMAL
BACTERIA UR CULT: NORMAL
BACTERIA: NEGATIVE /HPF
BILIRUBIN URINE: NEGATIVE
BLOOD URINE: NEGATIVE
CAST: 0 /LPF
COLOR: YELLOW
EPITHELIAL CELLS: 1 /HPF
GLUCOSE QUALITATIVE U: NEGATIVE MG/DL
KETONES URINE: NEGATIVE MG/DL
LEUKOCYTE ESTERASE URINE: NEGATIVE
MICROSCOPIC-UA: NORMAL
NITRITE URINE: NEGATIVE
PH URINE: 7.5
PROTEIN URINE: NEGATIVE MG/DL
RED BLOOD CELLS URINE: 1 /HPF
SPECIFIC GRAVITY URINE: 1.01
UROBILINOGEN URINE: 0.2 MG/DL
WHITE BLOOD CELLS URINE: 0 /HPF

## 2023-05-12 ENCOUNTER — NON-APPOINTMENT (OUTPATIENT)
Age: 68
End: 2023-05-12

## 2023-06-12 ENCOUNTER — APPOINTMENT (OUTPATIENT)
Dept: UROLOGY | Facility: CLINIC | Age: 68
End: 2023-06-12
Payer: MEDICARE

## 2023-06-12 VITALS — OXYGEN SATURATION: 99 % | DIASTOLIC BLOOD PRESSURE: 81 MMHG | HEART RATE: 77 BPM | SYSTOLIC BLOOD PRESSURE: 140 MMHG

## 2023-06-12 DIAGNOSIS — N39.3 STRESS INCONTINENCE (FEMALE) (MALE): ICD-10-CM

## 2023-06-12 DIAGNOSIS — R35.1 NOCTURIA: ICD-10-CM

## 2023-06-12 DIAGNOSIS — R35.0 FREQUENCY OF MICTURITION: ICD-10-CM

## 2023-06-12 PROCEDURE — 99215 OFFICE O/P EST HI 40 MIN: CPT

## 2023-06-12 PROCEDURE — 51798 US URINE CAPACITY MEASURE: CPT

## 2023-06-12 RX ORDER — OXYCODONE HYDROCHLORIDE 10 MG/1
10 TABLET, FILM COATED, EXTENDED RELEASE ORAL
Qty: 10 | Refills: 0 | Status: COMPLETED | COMMUNITY
Start: 2018-01-25 | End: 2023-06-12

## 2023-06-12 RX ORDER — TRAMADOL HYDROCHLORIDE 50 MG/1
50 TABLET, COATED ORAL
Qty: 4 | Refills: 0 | Status: COMPLETED | COMMUNITY
Start: 2017-12-29 | End: 2023-06-12

## 2023-06-12 RX ORDER — LOSARTAN POTASSIUM 100 MG/1
100 TABLET, FILM COATED ORAL
Refills: 0 | Status: ACTIVE | COMMUNITY

## 2023-06-12 RX ORDER — CELECOXIB 200 MG/1
200 CAPSULE ORAL
Qty: 42 | Refills: 0 | Status: COMPLETED | COMMUNITY
Start: 2018-01-25 | End: 2023-06-12

## 2023-06-12 RX ORDER — METOPROLOL TARTRATE 25 MG/1
25 TABLET, FILM COATED ORAL
Refills: 0 | Status: ACTIVE | COMMUNITY

## 2023-06-12 RX ORDER — DOCUSATE SODIUM 100 MG/1
100 CAPSULE, LIQUID FILLED ORAL
Qty: 60 | Refills: 0 | Status: COMPLETED | COMMUNITY
Start: 2018-01-25 | End: 2023-06-12

## 2023-06-12 RX ORDER — OXYCODONE AND ACETAMINOPHEN 10; 325 MG/1; MG/1
10-325 TABLET ORAL
Qty: 42 | Refills: 0 | Status: COMPLETED | COMMUNITY
Start: 2017-12-05 | End: 2023-06-12

## 2023-06-12 RX ORDER — OXYCODONE 10 MG/1
10 TABLET ORAL
Qty: 45 | Refills: 0 | Status: COMPLETED | COMMUNITY
Start: 2018-01-25 | End: 2023-06-12

## 2023-06-12 RX ORDER — ASPIRIN 325 MG/1
325 TABLET ORAL
Qty: 84 | Refills: 0 | Status: COMPLETED | COMMUNITY
Start: 2018-01-25 | End: 2023-06-12

## 2023-06-12 NOTE — HISTORY OF PRESENT ILLNESS
[FreeTextEntry1] : 67 yr old, , female patient with hx HTN presents to office today for evaluation of urinary incontinence x 3 years, accompanied by spouse, Robby. Referred by Dr. Stockton.\par \par Patient had tried and failed Kegels exercise x 1 year. \par \par DTF q30 - 1hrs\par Nocturia 4x\par denies UUI\par + ANGELLA \par do not wear liner/pads\par sometimes needs to change underwear\par sometimes sensation of incomplete bladder emptying\par denies straining to void \par denies constipation - daily BM \par denies hematuria and dysuria \par \par as per , patient has a light snoring \par \par no hx of UTI in the past 6 months\par \par no coffee\par 1 cup of green tea daily \par \par Never smoker \par \par hysterectomy - \par lumpectomy -

## 2023-06-12 NOTE — ASSESSMENT
[FreeTextEntry1] : Plan:\par \par - PVR 0 CC \par \par - Recommended to switch decaf tea \par \par - We discussed second line therapies for OAB including medications from the anti-cholinergic and B3 agonist categories. Side effects from each category were reviewed - defer med for now. \par \par - Discussed non-surgical including weight loss, pelvic floor physical therapy, and pessary with knob.\par \par - Discussed surgical interventions including douglas-urethral bulking agent, abdominal approaches to to urinary incontinence, and synthetic and fascial slings.  The risks/benefits/alternatives of each were discussed.\par \par - Retropubic mid-urethral sling\par We reviewed the r/b/a including urinary retention, need for revision or prolonged catheterization, infection, mesh erosion, pelvic pain, dyspareunia, urinary urgency symptoms, and residual incontinence. We discussed the April 2019 FDA withdrawal of vaginal mesh for POP and the difference between that surgical mesh and the vaginal mesh sling for ANGELLA. We discussed the postoperative course including potential need for urinary catheter, revision surgery, and 6 weeks no heavy lifting >5 lbs, no strenuous exercise, nothing per vagina.\par \par \par \par

## 2023-06-12 NOTE — END OF VISIT
[FreeTextEntry3] : we discussed the non surgical and surgical options for ANGELLA\par she is very motivated for surgery\par \par Retropubic mid-urethral sling\par We reviewed the r/b/a including urinary retention, need for revision or prolonged catheterization, infection, mesh erosion, pelvic pain, dyspareunia, urinary urgency symptoms, and residual incontinence. We discussed the April 2019 FDA withdrawal of vaginal mesh for POP and the difference between that surgical mesh and the vaginal mesh sling for ANGELLA. We discussed the postoperative course including potential need for urinary catheter, revision surgery, and 6 weeks no heavy lifting >5 lbs, no strenuous exercise, nothing per vagina.\par \par \par she does swim a lot during the summer\par she would like surgery in September\par \par she will call our office to schedule in July/Aug if she decides \par \par \par The total time spent with the patient includes face to face time as well as time for documentation, ordering medications/labs/procedures, and care coordination, but I acknowledge it does not include time spent on any procedures performed (eg PVR, UDS, Cystoscopy, catheter changes, etc).  Time includes reviewing the chart prior to visit, documentation, and correspondence.\par  [Time Spent: ___ minutes] : I have spent [unfilled] minutes of time on the encounter.

## 2023-09-08 ENCOUNTER — OUTPATIENT (OUTPATIENT)
Dept: OUTPATIENT SERVICES | Facility: HOSPITAL | Age: 68
LOS: 1 days | End: 2023-09-08
Payer: COMMERCIAL

## 2023-09-08 VITALS
OXYGEN SATURATION: 96 % | HEART RATE: 89 BPM | HEIGHT: 59 IN | TEMPERATURE: 99 F | SYSTOLIC BLOOD PRESSURE: 120 MMHG | RESPIRATION RATE: 18 BRPM | DIASTOLIC BLOOD PRESSURE: 82 MMHG | WEIGHT: 184.09 LBS

## 2023-09-08 DIAGNOSIS — Z98.890 OTHER SPECIFIED POSTPROCEDURAL STATES: Chronic | ICD-10-CM

## 2023-09-08 DIAGNOSIS — N39.3 STRESS INCONTINENCE (FEMALE) (MALE): ICD-10-CM

## 2023-09-08 DIAGNOSIS — Z98.1 ARTHRODESIS STATUS: Chronic | ICD-10-CM

## 2023-09-08 DIAGNOSIS — Z90.710 ACQUIRED ABSENCE OF BOTH CERVIX AND UTERUS: Chronic | ICD-10-CM

## 2023-09-08 DIAGNOSIS — Z96.641 PRESENCE OF RIGHT ARTIFICIAL HIP JOINT: Chronic | ICD-10-CM

## 2023-09-08 LAB
ANION GAP SERPL CALC-SCNC: 14 MMOL/L — SIGNIFICANT CHANGE UP (ref 5–17)
BUN SERPL-MCNC: 19 MG/DL — SIGNIFICANT CHANGE UP (ref 7–23)
CALCIUM SERPL-MCNC: 10.6 MG/DL — HIGH (ref 8.4–10.5)
CHLORIDE SERPL-SCNC: 102 MMOL/L — SIGNIFICANT CHANGE UP (ref 96–108)
CO2 SERPL-SCNC: 24 MMOL/L — SIGNIFICANT CHANGE UP (ref 22–31)
CREAT SERPL-MCNC: 1.51 MG/DL — HIGH (ref 0.5–1.3)
EGFR: 37 ML/MIN/1.73M2 — LOW
GLUCOSE SERPL-MCNC: 102 MG/DL — HIGH (ref 70–99)
HCT VFR BLD CALC: 43.1 % — SIGNIFICANT CHANGE UP (ref 34.5–45)
HGB BLD-MCNC: 14 G/DL — SIGNIFICANT CHANGE UP (ref 11.5–15.5)
MCHC RBC-ENTMCNC: 32.5 GM/DL — SIGNIFICANT CHANGE UP (ref 32–36)
MCHC RBC-ENTMCNC: 33 PG — SIGNIFICANT CHANGE UP (ref 27–34)
MCV RBC AUTO: 101.7 FL — HIGH (ref 80–100)
NRBC # BLD: 0 /100 WBCS — SIGNIFICANT CHANGE UP (ref 0–0)
PLATELET # BLD AUTO: 377 K/UL — SIGNIFICANT CHANGE UP (ref 150–400)
POTASSIUM SERPL-MCNC: 4.8 MMOL/L — SIGNIFICANT CHANGE UP (ref 3.5–5.3)
POTASSIUM SERPL-SCNC: 4.8 MMOL/L — SIGNIFICANT CHANGE UP (ref 3.5–5.3)
RBC # BLD: 4.24 M/UL — SIGNIFICANT CHANGE UP (ref 3.8–5.2)
RBC # FLD: 14 % — SIGNIFICANT CHANGE UP (ref 10.3–14.5)
SODIUM SERPL-SCNC: 140 MMOL/L — SIGNIFICANT CHANGE UP (ref 135–145)
WBC # BLD: 8.2 K/UL — SIGNIFICANT CHANGE UP (ref 3.8–10.5)
WBC # FLD AUTO: 8.2 K/UL — SIGNIFICANT CHANGE UP (ref 3.8–10.5)

## 2023-09-08 PROCEDURE — 80048 BASIC METABOLIC PNL TOTAL CA: CPT

## 2023-09-08 PROCEDURE — G0463: CPT

## 2023-09-08 PROCEDURE — 85027 COMPLETE CBC AUTOMATED: CPT

## 2023-09-08 PROCEDURE — 36415 COLL VENOUS BLD VENIPUNCTURE: CPT

## 2023-09-08 PROCEDURE — 87086 URINE CULTURE/COLONY COUNT: CPT

## 2023-09-08 RX ORDER — SODIUM CHLORIDE 9 MG/ML
3 INJECTION INTRAMUSCULAR; INTRAVENOUS; SUBCUTANEOUS EVERY 8 HOURS
Refills: 0 | Status: DISCONTINUED | OUTPATIENT
Start: 2023-09-19 | End: 2023-09-19

## 2023-09-08 RX ORDER — LIDOCAINE HCL 20 MG/ML
0.2 VIAL (ML) INJECTION ONCE
Refills: 0 | Status: DISCONTINUED | OUTPATIENT
Start: 2023-09-19 | End: 2023-09-19

## 2023-09-08 RX ORDER — CEFAZOLIN SODIUM 1 G
2000 VIAL (EA) INJECTION ONCE
Refills: 0 | Status: COMPLETED | OUTPATIENT
Start: 2023-09-19 | End: 2023-09-19

## 2023-09-08 RX ORDER — CHLORHEXIDINE GLUCONATE 213 G/1000ML
1 SOLUTION TOPICAL ONCE
Refills: 0 | Status: DISCONTINUED | OUTPATIENT
Start: 2023-09-19 | End: 2023-09-19

## 2023-09-08 NOTE — H&P PST ADULT - MUSCULOSKELETAL
ambulating with cane/no calf tenderness/decreased ROM due to pain/joint swelling/strength 5/5 bilateral upper extremities/abnormal gait details…

## 2023-09-08 NOTE — H&P PST ADULT - HISTORY OF PRESENT ILLNESS
Pt is a 69 yo F obese (BMI 37) with PMH HTN, breast ca, s/p lumpectomy, OA, s/p R DAVID, urinary incontinence x 3 years.  Plan for retropubic midurethral sling, cystoscopy on 9/19/23 with Dr. Beauchamp.

## 2023-09-08 NOTE — H&P PST ADULT - ASSESSMENT
DASI score: 5.07  DASI activity: Can do moderate housework, can't run or do strenuous activity  Loose teeth or dentures: Denies  Airway: MP2

## 2023-09-08 NOTE — H&P PST ADULT - PROBLEM SELECTOR PLAN 1
Plan for retropubic midurethral sling, cystoscopy on 9/19/23 with Dr. Beauchamp.   PST labs sent   Pre procedure surgical scrub instructions discussed

## 2023-09-08 NOTE — H&P PST ADULT - NSICDXPASTMEDICALHX_GEN_ALL_CORE_FT
PAST MEDICAL HISTORY:  Breast cancer in female left breast    Frequency of urination 4x at night    History of osteoarthritis     HTN (hypertension)     Stress incontinence

## 2023-09-08 NOTE — H&P PST ADULT - NSICDXPASTSURGICALHX_GEN_ALL_CORE_FT
PAST SURGICAL HISTORY:  History of hip replacement, total, right     S/P ankle fusion right and foot [id number]    S/P bunionectomy left 12-15  and right 2003    S/P hysterectomy uterus only 1982    S/P lumpectomy, left breast 2010  tx with raditation

## 2023-09-08 NOTE — H&P PST ADULT - LAST ECHOCARDIOGRAM
Last done 11/9/2020 (in cards note ) EF 60%, normal diastolic function, normal aortic valve, trace MR

## 2023-09-11 LAB
CULTURE RESULTS: SIGNIFICANT CHANGE UP
SPECIMEN SOURCE: SIGNIFICANT CHANGE UP

## 2023-09-18 ENCOUNTER — TRANSCRIPTION ENCOUNTER (OUTPATIENT)
Age: 68
End: 2023-09-18

## 2023-09-18 DIAGNOSIS — R82.71 BACTERIURIA: ICD-10-CM

## 2023-09-18 RX ORDER — SULFAMETHOXAZOLE AND TRIMETHOPRIM 800; 160 MG/1; MG/1
800-160 TABLET ORAL
Qty: 6 | Refills: 0 | Status: ACTIVE | COMMUNITY
Start: 2023-09-18 | End: 1900-01-01

## 2023-09-19 ENCOUNTER — APPOINTMENT (OUTPATIENT)
Dept: UROLOGY | Facility: HOSPITAL | Age: 68
End: 2023-09-19

## 2023-09-19 ENCOUNTER — TRANSCRIPTION ENCOUNTER (OUTPATIENT)
Age: 68
End: 2023-09-19

## 2023-09-19 ENCOUNTER — OUTPATIENT (OUTPATIENT)
Dept: INPATIENT UNIT | Facility: HOSPITAL | Age: 68
LOS: 1 days | End: 2023-09-19
Payer: COMMERCIAL

## 2023-09-19 VITALS
RESPIRATION RATE: 16 BRPM | OXYGEN SATURATION: 100 % | HEIGHT: 59 IN | TEMPERATURE: 98 F | SYSTOLIC BLOOD PRESSURE: 132 MMHG | DIASTOLIC BLOOD PRESSURE: 87 MMHG | HEART RATE: 99 BPM | WEIGHT: 184.09 LBS

## 2023-09-19 VITALS
SYSTOLIC BLOOD PRESSURE: 141 MMHG | OXYGEN SATURATION: 99 % | HEART RATE: 89 BPM | DIASTOLIC BLOOD PRESSURE: 60 MMHG | RESPIRATION RATE: 16 BRPM

## 2023-09-19 DIAGNOSIS — Z98.1 ARTHRODESIS STATUS: Chronic | ICD-10-CM

## 2023-09-19 DIAGNOSIS — Z90.710 ACQUIRED ABSENCE OF BOTH CERVIX AND UTERUS: Chronic | ICD-10-CM

## 2023-09-19 DIAGNOSIS — Z98.890 OTHER SPECIFIED POSTPROCEDURAL STATES: Chronic | ICD-10-CM

## 2023-09-19 DIAGNOSIS — Z96.641 PRESENCE OF RIGHT ARTIFICIAL HIP JOINT: Chronic | ICD-10-CM

## 2023-09-19 DIAGNOSIS — N39.3 STRESS INCONTINENCE (FEMALE) (MALE): ICD-10-CM

## 2023-09-19 PROCEDURE — 57288 REPAIR BLADDER DEFECT: CPT

## 2023-09-19 PROCEDURE — C1771: CPT

## 2023-09-19 DEVICE — SLING FEMALE DESARA BLUE SS: Type: IMPLANTABLE DEVICE | Status: FUNCTIONAL

## 2023-09-19 RX ORDER — POLYETHYLENE GLYCOL 3350 17 G/17G
17 POWDER, FOR SOLUTION ORAL
Qty: 1 | Refills: 0
Start: 2023-09-19 | End: 2023-10-02

## 2023-09-19 RX ORDER — FENTANYL CITRATE 50 UG/ML
25 INJECTION INTRAVENOUS
Refills: 0 | Status: DISCONTINUED | OUTPATIENT
Start: 2023-09-19 | End: 2023-09-19

## 2023-09-19 RX ORDER — LOSARTAN POTASSIUM 100 MG/1
1 TABLET, FILM COATED ORAL
Refills: 0 | DISCHARGE

## 2023-09-19 RX ORDER — OMEGA-3 ACID ETHYL ESTERS 1 G
1 CAPSULE ORAL
Qty: 0 | Refills: 0 | DISCHARGE

## 2023-09-19 RX ORDER — ONDANSETRON 8 MG/1
4 TABLET, FILM COATED ORAL ONCE
Refills: 0 | Status: DISCONTINUED | OUTPATIENT
Start: 2023-09-19 | End: 2023-09-19

## 2023-09-19 RX ORDER — UBIDECARENONE 100 MG
1 CAPSULE ORAL
Qty: 0 | Refills: 0 | DISCHARGE

## 2023-09-19 RX ORDER — SODIUM CHLORIDE 9 MG/ML
1000 INJECTION, SOLUTION INTRAVENOUS
Refills: 0 | Status: DISCONTINUED | OUTPATIENT
Start: 2023-09-19 | End: 2023-10-03

## 2023-09-19 RX ORDER — CHOLECALCIFEROL (VITAMIN D3) 125 MCG
1 CAPSULE ORAL
Qty: 0 | Refills: 0 | DISCHARGE

## 2023-09-19 RX ORDER — OXYCODONE HYDROCHLORIDE 5 MG/1
1 TABLET ORAL
Qty: 8 | Refills: 0
Start: 2023-09-19

## 2023-09-19 RX ORDER — METOPROLOL TARTRATE 50 MG
1 TABLET ORAL
Refills: 0 | DISCHARGE

## 2023-09-19 NOTE — BRIEF OPERATIVE NOTE - COMMENTS
[id number] dictation number Sarecycline Counseling: Patient advised regarding possible photosensitivity and discoloration of the teeth, skin, lips, tongue and gums.  Patient instructed to avoid sunlight, if possible.  When exposed to sunlight, patients should wear protective clothing, sunglasses, and sunscreen.  The patient was instructed to call the office immediately if the following severe adverse effects occur:  hearing changes, easy bruising/bleeding, severe headache, or vision changes.  The patient verbalized understanding of the proper use and possible adverse effects of sarecycline.  All of the patient's questions and concerns were addressed.

## 2023-09-19 NOTE — ASU PATIENT PROFILE, ADULT - FALL HARM RISK - HARM RISK INTERVENTIONS

## 2023-09-19 NOTE — ASU DISCHARGE PLAN (ADULT/PEDIATRIC) - CARE PROVIDER_API CALL
Lenny Beauchamp  Urology  [address] [place] [id number]  Phone: [phone]  Fax: [phone]  Established Patient  Follow Up Time:

## 2023-09-19 NOTE — ASU DISCHARGE PLAN (ADULT/PEDIATRIC) - ASU DC SPECIAL INSTRUCTIONSFT
GENERAL: It is common to have blood in your urine after your procedure. It may be pink or even red; inform your doctor if you have a significant amount of clot in the urine or if you are unable to void at all. The urine may clear and then become bloody again especially as you are more physically active.  BATHING: You may shower or bathe.  DIET: You may resume your regular diet and regular medication regimen.  PAIN: You may take Tylenol (acetaminophen) 650-975mg for pain every 6 hours for the first 2 days after surgery. Do not exceed 4000mg of Tylenol (acetaminophen) daily. A prescription for narcotic pain medication has been sent to your pharmacy.  ANTIBIOTICS: Please finish the course of antibiotics you have at home.  STOOL SOFTENERS: Do not allow yourself to become constipated as straining may cause bleeding. A prescription for Miralax has been sent to your pharmacy for the next 2 weeks - please take unless you have diarrhea.  ACTIVITY: No heavy lifting or strenuous exercise until you are evaluated at your post-operative appointment. Otherwise, you may return to your usual level of physical activity.  FOLLOW-UP: Please follow up with Dr. Beauchamp in 6 weeks.  CALL YOUR UROLOGIST IF: You have any bleeding that does not stop, inability to void >8 hours, fever over 100.4 F, chills, persistent nausea/vomiting, changes in your incision concerning for infection, or if your pain is not controlled on your discharge pain medications.

## 2023-09-19 NOTE — PRE-ANESTHESIA EVALUATION ADULT - NSANTHPMHFT_GEN_ALL_CORE
67 yo F with PMHx of obesity HTN, breast ca, s/p lumpectomy, OA, s/p R DAVID, urinary incontinence x 3 years presenting for retropubic midurethral sling and cystoscopy. H&P, labs, and pertinent imaging reviewed. METS>4. Plan for general anesthesia d/w pt and all questions answered

## 2023-09-22 PROBLEM — I10 ESSENTIAL (PRIMARY) HYPERTENSION: Chronic | Status: ACTIVE | Noted: 2023-09-08

## 2023-09-22 PROBLEM — N39.3 STRESS INCONTINENCE (FEMALE) (MALE): Chronic | Status: ACTIVE | Noted: 2023-09-08

## 2023-10-30 ENCOUNTER — APPOINTMENT (OUTPATIENT)
Dept: UROLOGY | Facility: CLINIC | Age: 68
End: 2023-10-30
Payer: MEDICARE

## 2023-10-30 VITALS
RESPIRATION RATE: 16 BRPM | DIASTOLIC BLOOD PRESSURE: 78 MMHG | SYSTOLIC BLOOD PRESSURE: 128 MMHG | HEART RATE: 109 BPM | TEMPERATURE: 97.2 F

## 2023-10-30 DIAGNOSIS — N36.42 INTRINSIC SPHINCTER DEFICIENCY (ISD): ICD-10-CM

## 2023-10-30 DIAGNOSIS — Z98.890 OTHER SPECIFIED POSTPROCEDURAL STATES: ICD-10-CM

## 2023-10-30 PROCEDURE — 99024 POSTOP FOLLOW-UP VISIT: CPT

## 2023-11-02 ENCOUNTER — NON-APPOINTMENT (OUTPATIENT)
Age: 68
End: 2023-11-02

## 2023-11-02 DIAGNOSIS — N39.0 URINARY TRACT INFECTION, SITE NOT SPECIFIED: ICD-10-CM

## 2023-11-02 LAB
APPEARANCE: ABNORMAL
BACTERIA: ABNORMAL /HPF
BILIRUBIN URINE: NEGATIVE
BLOOD URINE: ABNORMAL
COLOR: YELLOW
GLUCOSE QUALITATIVE U: NEGATIVE MG/DL
GRANULAR CASTS: NORMAL
HYALINE CASTS: NORMAL
KETONES URINE: NEGATIVE MG/DL
LEUKOCYTE ESTERASE URINE: ABNORMAL
MICROSCOPIC-UA: NORMAL
NITRITE URINE: POSITIVE
PH URINE: 7
PROTEIN URINE: 30 MG/DL
RED BLOOD CELLS URINE: 1 /HPF
SPECIFIC GRAVITY URINE: 1.01
SQUAMOUS EPITHELIAL CELLS: 4
UROBILINOGEN URINE: 0.2 MG/DL
WHITE BLOOD CELLS URINE: 669 /HPF

## 2023-11-02 RX ORDER — SULFAMETHOXAZOLE AND TRIMETHOPRIM 800; 160 MG/1; MG/1
800-160 TABLET ORAL TWICE DAILY
Qty: 10 | Refills: 0 | Status: ACTIVE | COMMUNITY
Start: 2023-11-02 | End: 1900-01-01

## 2023-11-03 LAB — BACTERIA UR CULT: ABNORMAL

## 2024-02-07 ENCOUNTER — APPOINTMENT (OUTPATIENT)
Dept: ORTHOPEDIC SURGERY | Facility: CLINIC | Age: 69
End: 2024-02-07
Payer: MEDICARE

## 2024-02-07 DIAGNOSIS — Z00.00 ENCOUNTER FOR GENERAL ADULT MEDICAL EXAMINATION W/OUT ABNORMAL FINDINGS: ICD-10-CM

## 2024-02-07 DIAGNOSIS — M19.071 PRIMARY OSTEOARTHRITIS, RIGHT ANKLE AND FOOT: ICD-10-CM

## 2024-02-07 PROCEDURE — 73630 X-RAY EXAM OF FOOT: CPT | Mod: RT

## 2024-02-07 PROCEDURE — 99214 OFFICE O/P EST MOD 30 MIN: CPT

## 2024-02-07 PROCEDURE — 73600 X-RAY EXAM OF ANKLE: CPT | Mod: RT

## 2024-02-07 NOTE — DISCUSSION/SUMMARY
[de-identified] : Pt. is stable and [place] brace is in good repair currently.  She was encouraged to continue to be consistent with brace as she is still stable and no significant changes on exam/xray.  We discussed new brace after November 2024 when insurance should cover after 3 years from previous brace.  If any issues before, she will return to office sooner.

## 2024-02-07 NOTE — HISTORY OF PRESENT ILLNESS
[2] : 2 [0] : 0 [Dull/Aching] : dull/aching [de-identified] : Pt is a 68 year old female who presents today for evaluation of her right foot/ankle. She has known ankle arthritis and wears an [place] brace. Reports brace is supportive. She is s/p RT triple arthrodesis by Dr. Lombardi 2014. WB in supportive sneaker, using [place] brace and cane for support. [] : no [FreeTextEntry1] : right foot

## 2024-02-07 NOTE — PHYSICAL EXAM
[NL (40)] : plantar flexion 40 degrees [5___] : eversion 5[unfilled]/5 [2+] : posterior tibialis pulse: 2+ [Normal] : saphenous nerve sensation normal [2nd] : 2nd [3rd] : 3rd [4th] : 4th [5th] : 5th [Mild] : mild diffused ankle swelling [] : ambulation with cane [Right] : right foot [Weight -] : weightbearing [No loss of surgical correlation. Bony alignment acceptable. Hardware in appropriate position] : No loss of surgical correlation. Bony alignment acceptable. Hardware in appropriate position [de-identified] : WB with [place] brace.  [FreeTextEntry9] : Tibiotalar joint degenerative change with same talar tilt, mild erosion into tibial plafond compared to previous films.  [de-identified] : inversion 0 degrees [de-identified] : eversion 10 degrees

## 2024-05-02 ENCOUNTER — APPOINTMENT (OUTPATIENT)
Dept: UROLOGY | Facility: CLINIC | Age: 69
End: 2024-05-02

## 2024-05-28 NOTE — H&P PST ADULT - NSANTHNECKRD_ENT_A_CORE
TRAUMA ACTIVATION LEVEL: ALERT  ACTIVATED BY: ED  INTUBATED: NO      MECHANISM OF INJURY:   [] Blunt     [] MVC	  [XX] Fall	  [] Pedestrian Struck	    GCS: 15 	E: 4	V: 5	M: 6    HPI:    72yF w/ PMHx of HTN, HLD, DM, OA, right hip surgery seen as a trauma transfer from [facility] s/p mechanical fall today -HT, -LOC, -AC.  Patient was entering her friend's home from the garage when she tripped over 1 step falling on to her right side. She was not able to get up and walk without great amount of pain. Denies pain to anywhere else. Denies chest pain, SOB, fever or chills, abdominal pain or difficulty urinating. Trauma assessment in ED: ABCs intact , GCS 15 , AAOx3.    PAST MEDICAL & SURGICAL HISTORY:  DM (diabetes mellitus)  Hypercholesteremia  HTN (hypertension)  OA (osteoarthritis)  History of joint surgery  Right HIP ORIF  [facility]  History of tonsillectomy and adenoidectomy  1957      Allergies  No Known Allergies  Intolerances No

## 2024-12-04 ENCOUNTER — APPOINTMENT (OUTPATIENT)
Dept: ORTHOPEDIC SURGERY | Facility: CLINIC | Age: 69
End: 2024-12-04
Payer: MEDICARE

## 2024-12-04 DIAGNOSIS — M19.071 PRIMARY OSTEOARTHRITIS, RIGHT ANKLE AND FOOT: ICD-10-CM

## 2024-12-04 DIAGNOSIS — Z98.1 ARTHRODESIS STATUS: ICD-10-CM

## 2024-12-04 PROCEDURE — 99213 OFFICE O/P EST LOW 20 MIN: CPT

## (undated) DEVICE — LUBRICATING JELLY ONESHOT 1.25OZ

## (undated) DEVICE — MEDICATION LABELS W MARKER

## (undated) DEVICE — GLV 7.5 PROTEXIS (WHITE)

## (undated) DEVICE — POSITIONER FOAM EGG CRATE ULNAR 2PCS (PINK)

## (undated) DEVICE — TUBING SUCTION 20FT

## (undated) DEVICE — TUBING TUR 2 PRONG

## (undated) DEVICE — DRSG KLING 4"

## (undated) DEVICE — WARMING BLANKET UPPER ADULT

## (undated) DEVICE — SUT POLYSORB 2-0 18" V-20

## (undated) DEVICE — SUT PDS II 2-0 27" SH

## (undated) DEVICE — PACK GYN LAPAROSCOPY

## (undated) DEVICE — DRAPE MAYO STAND 30"

## (undated) DEVICE — SYR LUER LOK 20CC

## (undated) DEVICE — SOL IRR BAG H2O 3000ML

## (undated) DEVICE — PREP BETADINE KIT

## (undated) DEVICE — DRAPE TOWEL BLUE 17" X 24"

## (undated) DEVICE — FOLEY CATH 2-WAY 18FR 5CC LATEX HYDROGEL

## (undated) DEVICE — LONE STAR ELASTIC STAY HOOK 5MM SHARP

## (undated) DEVICE — SOL IRR POUR NS 0.9% 500ML

## (undated) DEVICE — GLV 6.5 PROTEXIS (WHITE)

## (undated) DEVICE — NDL HYPO REGULAR BEVEL 22G X 1.5" (TURQUOISE)

## (undated) DEVICE — DRAPE INSTRUMENT POUCH 6.75" X 11"

## (undated) DEVICE — DRSG DERMABOND 0.7ML

## (undated) DEVICE — SPECIMEN CONTAINER 100ML

## (undated) DEVICE — LONE STAR RETRACTOR RING 32.5CM X 18.3CM DISP

## (undated) DEVICE — VENODYNE/SCD SLEEVE CALF LARGE

## (undated) DEVICE — DRAPE LIGHT HANDLE COVER (BLUE)

## (undated) DEVICE — GLV 7 PROTEXIS (WHITE)

## (undated) DEVICE — SOL IRR POUR H2O 250ML

## (undated) DEVICE — PREP BETADINE SPONGE STICKS

## (undated) DEVICE — FOLEY TRAY 16FR 5CC LTX UMETER CLOSED

## (undated) DEVICE — SUCTION YANKAUER NO CONTROL VENT